# Patient Record
Sex: MALE | Race: WHITE | NOT HISPANIC OR LATINO
[De-identification: names, ages, dates, MRNs, and addresses within clinical notes are randomized per-mention and may not be internally consistent; named-entity substitution may affect disease eponyms.]

---

## 2017-05-04 ENCOUNTER — NON-APPOINTMENT (OUTPATIENT)
Age: 72
End: 2017-05-04

## 2017-05-04 ENCOUNTER — APPOINTMENT (OUTPATIENT)
Dept: ELECTROPHYSIOLOGY | Facility: CLINIC | Age: 72
End: 2017-05-04

## 2017-05-04 VITALS
WEIGHT: 172 LBS | HEART RATE: 70 BPM | DIASTOLIC BLOOD PRESSURE: 87 MMHG | BODY MASS INDEX: 26.07 KG/M2 | HEIGHT: 68 IN | SYSTOLIC BLOOD PRESSURE: 147 MMHG | OXYGEN SATURATION: 97 %

## 2017-05-04 DIAGNOSIS — Z86.79 PERSONAL HISTORY OF OTHER DISEASES OF THE CIRCULATORY SYSTEM: ICD-10-CM

## 2017-05-19 ENCOUNTER — MESSAGE (OUTPATIENT)
Age: 72
End: 2017-05-19

## 2017-05-24 ENCOUNTER — MEDICATION RENEWAL (OUTPATIENT)
Age: 72
End: 2017-05-24

## 2017-05-30 ENCOUNTER — APPOINTMENT (OUTPATIENT)
Dept: ELECTROPHYSIOLOGY | Facility: CLINIC | Age: 72
End: 2017-05-30

## 2017-06-16 ENCOUNTER — OUTPATIENT (OUTPATIENT)
Dept: OUTPATIENT SERVICES | Facility: HOSPITAL | Age: 72
LOS: 1 days | End: 2017-06-16
Payer: MEDICARE

## 2017-06-16 VITALS
OXYGEN SATURATION: 99 % | HEIGHT: 68 IN | RESPIRATION RATE: 18 BRPM | HEART RATE: 63 BPM | WEIGHT: 169.98 LBS | TEMPERATURE: 99 F | DIASTOLIC BLOOD PRESSURE: 76 MMHG | SYSTOLIC BLOOD PRESSURE: 136 MMHG

## 2017-06-16 DIAGNOSIS — Z01.818 ENCOUNTER FOR OTHER PREPROCEDURAL EXAMINATION: ICD-10-CM

## 2017-06-16 DIAGNOSIS — Z90.49 ACQUIRED ABSENCE OF OTHER SPECIFIED PARTS OF DIGESTIVE TRACT: Chronic | ICD-10-CM

## 2017-06-16 DIAGNOSIS — I48.4 ATYPICAL ATRIAL FLUTTER: ICD-10-CM

## 2017-06-16 LAB
ALBUMIN SERPL ELPH-MCNC: 3.7 G/DL — SIGNIFICANT CHANGE UP (ref 3.3–5)
ALP SERPL-CCNC: 46 U/L — SIGNIFICANT CHANGE UP (ref 40–120)
ALT FLD-CCNC: 20 U/L RC — SIGNIFICANT CHANGE UP (ref 10–45)
ANION GAP SERPL CALC-SCNC: 11 MMOL/L — SIGNIFICANT CHANGE UP (ref 5–17)
APTT BLD: 33 SEC — SIGNIFICANT CHANGE UP (ref 27.5–37.4)
AST SERPL-CCNC: 25 U/L — SIGNIFICANT CHANGE UP (ref 10–40)
BILIRUB SERPL-MCNC: 0.4 MG/DL — SIGNIFICANT CHANGE UP (ref 0.2–1.2)
BLD GP AB SCN SERPL QL: NEGATIVE — SIGNIFICANT CHANGE UP
BUN SERPL-MCNC: 16 MG/DL — SIGNIFICANT CHANGE UP (ref 7–23)
CALCIUM SERPL-MCNC: 9.9 MG/DL — SIGNIFICANT CHANGE UP (ref 8.4–10.5)
CHLORIDE SERPL-SCNC: 105 MMOL/L — SIGNIFICANT CHANGE UP (ref 96–108)
CO2 SERPL-SCNC: 27 MMOL/L — SIGNIFICANT CHANGE UP (ref 22–31)
CREAT SERPL-MCNC: 0.7 MG/DL — SIGNIFICANT CHANGE UP (ref 0.5–1.3)
GLUCOSE SERPL-MCNC: 94 MG/DL — SIGNIFICANT CHANGE UP (ref 70–99)
HCT VFR BLD CALC: 42.6 % — SIGNIFICANT CHANGE UP (ref 39–50)
HGB BLD-MCNC: 13.8 G/DL — SIGNIFICANT CHANGE UP (ref 13–17)
INR BLD: 0.98 RATIO — SIGNIFICANT CHANGE UP (ref 0.88–1.16)
MCHC RBC-ENTMCNC: 31.9 PG — SIGNIFICANT CHANGE UP (ref 27–34)
MCHC RBC-ENTMCNC: 32.4 GM/DL — SIGNIFICANT CHANGE UP (ref 32–36)
MCV RBC AUTO: 98.5 FL — SIGNIFICANT CHANGE UP (ref 80–100)
PLATELET # BLD AUTO: 224 K/UL — SIGNIFICANT CHANGE UP (ref 150–400)
POTASSIUM SERPL-MCNC: 4 MMOL/L — SIGNIFICANT CHANGE UP (ref 3.5–5.3)
POTASSIUM SERPL-SCNC: 4 MMOL/L — SIGNIFICANT CHANGE UP (ref 3.5–5.3)
PROT SERPL-MCNC: 7.1 G/DL — SIGNIFICANT CHANGE UP (ref 6–8.3)
PROTHROM AB SERPL-ACNC: 10.7 SEC — SIGNIFICANT CHANGE UP (ref 9.8–12.7)
RBC # BLD: 4.32 M/UL — SIGNIFICANT CHANGE UP (ref 4.2–5.8)
RBC # FLD: 11.8 % — SIGNIFICANT CHANGE UP (ref 10.3–14.5)
RH IG SCN BLD-IMP: POSITIVE — SIGNIFICANT CHANGE UP
SODIUM SERPL-SCNC: 143 MMOL/L — SIGNIFICANT CHANGE UP (ref 135–145)
WBC # BLD: 5.1 K/UL — SIGNIFICANT CHANGE UP (ref 3.8–10.5)
WBC # FLD AUTO: 5.1 K/UL — SIGNIFICANT CHANGE UP (ref 3.8–10.5)

## 2017-06-16 PROCEDURE — 93010 ELECTROCARDIOGRAM REPORT: CPT

## 2017-06-16 NOTE — H&P CARDIOLOGY - PMH
Asthma, Chronic    BPH (Benign Prostatic Hyperplasia)    CAD (Coronary Artery Disease)    Paroxysmal Atrial Fibrillation    Popliteal Aneurysm

## 2017-06-16 NOTE — H&P CARDIOLOGY - HISTORY OF PRESENT ILLNESS
72 yo male pt with PMHx of CAD s/p stent x5 in 2006, asthma, BPH, b/l popliteal aneurysms s/p right popliteal stent and left popliteal bypass(2006 in [place]), asthma, A-fib ablation 7 years ago presents for PST for A-fib ablation on Monday (6/19). Pt reports he gets palpitation, paroxysmal a-fib about a year ago, evaluated by Dr. Jose Roberto Meraz then referred to Dr. Simmons for further evaluation and treatment. Currently pt denies palpitation or SOB.

## 2017-06-19 ENCOUNTER — OUTPATIENT (OUTPATIENT)
Dept: INPATIENT UNIT | Facility: HOSPITAL | Age: 72
LOS: 1 days | End: 2017-06-19
Payer: MEDICARE

## 2017-06-19 VITALS
TEMPERATURE: 98 F | DIASTOLIC BLOOD PRESSURE: 64 MMHG | WEIGHT: 169.98 LBS | OXYGEN SATURATION: 94 % | HEART RATE: 70 BPM | RESPIRATION RATE: 18 BRPM | SYSTOLIC BLOOD PRESSURE: 134 MMHG | HEIGHT: 68 IN

## 2017-06-19 DIAGNOSIS — I25.10 ATHEROSCLEROTIC HEART DISEASE OF NATIVE CORONARY ARTERY WITHOUT ANGINA PECTORIS: ICD-10-CM

## 2017-06-19 DIAGNOSIS — I48.0 PAROXYSMAL ATRIAL FIBRILLATION: ICD-10-CM

## 2017-06-19 DIAGNOSIS — I48.4 ATYPICAL ATRIAL FLUTTER: ICD-10-CM

## 2017-06-19 DIAGNOSIS — Z90.49 ACQUIRED ABSENCE OF OTHER SPECIFIED PARTS OF DIGESTIVE TRACT: Chronic | ICD-10-CM

## 2017-06-19 DIAGNOSIS — J45.909 UNSPECIFIED ASTHMA, UNCOMPLICATED: ICD-10-CM

## 2017-06-19 LAB
ALBUMIN SERPL ELPH-MCNC: 3.6 G/DL — SIGNIFICANT CHANGE UP (ref 3.3–5)
ALP SERPL-CCNC: 51 U/L — SIGNIFICANT CHANGE UP (ref 40–120)
ALT FLD-CCNC: 59 U/L RC — HIGH (ref 10–45)
ANION GAP SERPL CALC-SCNC: 13 MMOL/L — SIGNIFICANT CHANGE UP (ref 5–17)
APTT BLD: > 200 SEC (ref 27.5–37.4)
AST SERPL-CCNC: 98 U/L — HIGH (ref 10–40)
BILIRUB SERPL-MCNC: 0.7 MG/DL — SIGNIFICANT CHANGE UP (ref 0.2–1.2)
BUN SERPL-MCNC: 15 MG/DL — SIGNIFICANT CHANGE UP (ref 7–23)
CALCIUM SERPL-MCNC: 8.8 MG/DL — SIGNIFICANT CHANGE UP (ref 8.4–10.5)
CHLORIDE SERPL-SCNC: 108 MMOL/L — SIGNIFICANT CHANGE UP (ref 96–108)
CO2 SERPL-SCNC: 22 MMOL/L — SIGNIFICANT CHANGE UP (ref 22–31)
CREAT SERPL-MCNC: 0.7 MG/DL — SIGNIFICANT CHANGE UP (ref 0.5–1.3)
GLUCOSE SERPL-MCNC: 132 MG/DL — HIGH (ref 70–99)
HCT VFR BLD CALC: 40.3 % — SIGNIFICANT CHANGE UP (ref 39–50)
HGB BLD-MCNC: 13.5 G/DL — SIGNIFICANT CHANGE UP (ref 13–17)
INR BLD: 1.33 RATIO — HIGH (ref 0.88–1.16)
MAGNESIUM SERPL-MCNC: 1.9 MG/DL — SIGNIFICANT CHANGE UP (ref 1.6–2.6)
MCHC RBC-ENTMCNC: 33.2 PG — SIGNIFICANT CHANGE UP (ref 27–34)
MCHC RBC-ENTMCNC: 33.6 GM/DL — SIGNIFICANT CHANGE UP (ref 32–36)
MCV RBC AUTO: 98.8 FL — SIGNIFICANT CHANGE UP (ref 80–100)
PHOSPHATE SERPL-MCNC: 3.2 MG/DL — SIGNIFICANT CHANGE UP (ref 2.5–4.5)
PLATELET # BLD AUTO: 187 K/UL — SIGNIFICANT CHANGE UP (ref 150–400)
POTASSIUM SERPL-MCNC: 3.7 MMOL/L — SIGNIFICANT CHANGE UP (ref 3.5–5.3)
POTASSIUM SERPL-SCNC: 3.7 MMOL/L — SIGNIFICANT CHANGE UP (ref 3.5–5.3)
PROT SERPL-MCNC: 6.6 G/DL — SIGNIFICANT CHANGE UP (ref 6–8.3)
PROTHROM AB SERPL-ACNC: 14.4 SEC — HIGH (ref 9.8–12.7)
RBC # BLD: 4.08 M/UL — LOW (ref 4.2–5.8)
RBC # FLD: 12.3 % — SIGNIFICANT CHANGE UP (ref 10.3–14.5)
SODIUM SERPL-SCNC: 143 MMOL/L — SIGNIFICANT CHANGE UP (ref 135–145)
WBC # BLD: 7 K/UL — SIGNIFICANT CHANGE UP (ref 3.8–10.5)
WBC # FLD AUTO: 7 K/UL — SIGNIFICANT CHANGE UP (ref 3.8–10.5)

## 2017-06-19 PROCEDURE — 93010 ELECTROCARDIOGRAM REPORT: CPT

## 2017-06-19 RX ORDER — SIMVASTATIN 20 MG/1
40 TABLET, FILM COATED ORAL AT BEDTIME
Qty: 0 | Refills: 0 | Status: DISCONTINUED | OUTPATIENT
Start: 2017-06-19 | End: 2017-06-20

## 2017-06-19 RX ORDER — PANTOPRAZOLE SODIUM 20 MG/1
40 TABLET, DELAYED RELEASE ORAL
Qty: 0 | Refills: 0 | Status: DISCONTINUED | OUTPATIENT
Start: 2017-06-19 | End: 2017-06-20

## 2017-06-19 RX ORDER — BUDESONIDE AND FORMOTEROL FUMARATE DIHYDRATE 160; 4.5 UG/1; UG/1
2 AEROSOL RESPIRATORY (INHALATION)
Qty: 0 | Refills: 0 | Status: DISCONTINUED | OUTPATIENT
Start: 2017-06-19 | End: 2017-06-19

## 2017-06-19 RX ORDER — ALBUTEROL 90 UG/1
2 AEROSOL, METERED ORAL EVERY 6 HOURS
Qty: 0 | Refills: 0 | Status: DISCONTINUED | OUTPATIENT
Start: 2017-06-19 | End: 2017-06-20

## 2017-06-19 RX ORDER — POTASSIUM CHLORIDE 20 MEQ
40 PACKET (EA) ORAL ONCE
Qty: 0 | Refills: 0 | Status: COMPLETED | OUTPATIENT
Start: 2017-06-19 | End: 2017-06-19

## 2017-06-19 RX ORDER — HEPARIN SODIUM 5000 [USP'U]/ML
INJECTION INTRAVENOUS; SUBCUTANEOUS
Qty: 25000 | Refills: 0 | Status: DISCONTINUED | OUTPATIENT
Start: 2017-06-19 | End: 2017-06-20

## 2017-06-19 RX ORDER — HEPARIN SODIUM 5000 [USP'U]/ML
4700 INJECTION INTRAVENOUS; SUBCUTANEOUS EVERY 6 HOURS
Qty: 0 | Refills: 0 | Status: DISCONTINUED | OUTPATIENT
Start: 2017-06-19 | End: 2017-06-20

## 2017-06-19 RX ORDER — SUCRALFATE 1 G
1 TABLET ORAL EVERY 6 HOURS
Qty: 0 | Refills: 0 | Status: DISCONTINUED | OUTPATIENT
Start: 2017-06-19 | End: 2017-06-20

## 2017-06-19 RX ORDER — ASPIRIN/CALCIUM CARB/MAGNESIUM 324 MG
81 TABLET ORAL DAILY
Qty: 0 | Refills: 0 | Status: DISCONTINUED | OUTPATIENT
Start: 2017-06-20 | End: 2017-06-20

## 2017-06-19 RX ORDER — MOMETASONE FUROATE AND FORMOTEROL FUMARATE DIHYDRATE 200; 5 UG/1; UG/1
2 AEROSOL RESPIRATORY (INHALATION)
Qty: 0 | Refills: 0 | Status: DISCONTINUED | OUTPATIENT
Start: 2017-06-19 | End: 2017-06-20

## 2017-06-19 RX ORDER — DRONEDARONE 400 MG/1
400 TABLET, FILM COATED ORAL
Qty: 0 | Refills: 0 | Status: DISCONTINUED | OUTPATIENT
Start: 2017-06-19 | End: 2017-06-20

## 2017-06-19 RX ORDER — MAGNESIUM SULFATE 500 MG/ML
1 VIAL (ML) INJECTION ONCE
Qty: 0 | Refills: 0 | Status: COMPLETED | OUTPATIENT
Start: 2017-06-19 | End: 2017-06-19

## 2017-06-19 RX ORDER — ASPIRIN/CALCIUM CARB/MAGNESIUM 324 MG
325 TABLET ORAL DAILY
Qty: 0 | Refills: 0 | Status: DISCONTINUED | OUTPATIENT
Start: 2017-06-19 | End: 2017-06-19

## 2017-06-19 RX ORDER — MONTELUKAST 4 MG/1
10 TABLET, CHEWABLE ORAL DAILY
Qty: 0 | Refills: 0 | Status: DISCONTINUED | OUTPATIENT
Start: 2017-06-19 | End: 2017-06-20

## 2017-06-19 RX ADMIN — Medication 1 GRAM(S): at 23:43

## 2017-06-19 RX ADMIN — BUDESONIDE AND FORMOTEROL FUMARATE DIHYDRATE 2 PUFF(S): 160; 4.5 AEROSOL RESPIRATORY (INHALATION) at 21:06

## 2017-06-19 RX ADMIN — DRONEDARONE 400 MILLIGRAM(S): 400 TABLET, FILM COATED ORAL at 23:22

## 2017-06-19 RX ADMIN — Medication 1 DROP(S): at 23:22

## 2017-06-19 RX ADMIN — HEPARIN SODIUM 950 UNIT(S)/HR: 5000 INJECTION INTRAVENOUS; SUBCUTANEOUS at 23:30

## 2017-06-19 RX ADMIN — Medication 40 MILLIEQUIVALENT(S): at 21:05

## 2017-06-19 RX ADMIN — SIMVASTATIN 40 MILLIGRAM(S): 20 TABLET, FILM COATED ORAL at 21:07

## 2017-06-19 RX ADMIN — Medication 100 GRAM(S): at 21:04

## 2017-06-19 NOTE — PATIENT PROFILE ADULT. - NS SC CAGE ALCOHOL EYE OPENER
Problem: Chemotherapy Treatment  Goal: *Chemotherapy regimen followed  Outcome: Progressing Towards Goal  Pt here for C5. no

## 2017-06-19 NOTE — PROGRESS NOTE ADULT - SUBJECTIVE AND OBJECTIVE BOX
Patient is a 71y old  Male who presents with a chief complaint of  A fib  HPI:  72 yo male pt with PMHx of CAD s/p stent x5 in 2006, asthma, BPH, b/l popliteal aneurysms s/p right popliteal stent and left popliteal bypass(2006 in [place]), asthma, A-fib ablation 7 years ago presents for PST for A-fib ablation on Monday (6/19). Pt reports he gets palpitation, paroxysmal a-fib about a year ago, evaluated by Dr. Jose Roberto Meraz then referred to Dr. Simmons for further evaluation and treatment. Currently pt denies palpitation or SOB. (16 Jun 2017 08:53)      Interval Events: Now s/p A fib ablation    Medications  simvastatin 40milliGRAM(s) Oral at bedtime  buDESOnide 160 MICROgram(s)/formoterol 4.5 MICROgram(s) Inhaler 2Puff(s) Inhalation two times a day  dronedarone 400milliGRAM(s) Oral two times a day  potassium chloride    Tablet ER 40milliEquivalent(s) Oral once  magnesium sulfate  IVPB 1Gram(s) IV Intermittent once    ALBUTerol    90 MICROgram(s) HFA Inhaler 2Puff(s) Inhalation every 6 hours PRN  diltiazem    Tablet 30milliGRAM(s) Oral four times a day PRN  montelukast 10milliGRAM(s) Oral daily PRN  artificial  tears Solution 1Drop(s) Right EYE every 4 hours PRN      VS  T(C): 36.7, Max: 36.8 (06-19 @ 10:13)  HR: 98 (70 - 100)  BP: 123/75 (123/75 - 134/64)  RR: 15 (15 - 18)  SpO2: 96% (94% - 97%)  Wt(kg): --          REVIEW OF SYSTEMS  denies chest pain, palpitations, SOB, N, V; c/o eye dryness and right eye irritated;  all other negative        PHYSICAL EXAM:      Constitutional: well developed well nourished; NAD    HEENT: NC/AT; normal oral mucosa    Respiratory: regular unlabored    Cardiovascular: RRR; S1 S2 mild LE edema  Bilateral groin sites - dressings D & I; soft, sutures in place; no hematoma or ecchymosis    Gastrointestinal: soft ND/NT; + bowel sounds    Genitourinary: Caal cath draining clear yellow fluid    Extremities: ALCALA    Vascular: warm peripherally    Neurological: A & O x3    Skin: warm, dry, no rash or cyanosis          HEALTH ISSUES - PROBLEM Dx:

## 2017-06-19 NOTE — PROGRESS NOTE ADULT - ASSESSMENT
70 yo male pt with PMH of CAD s/p stent x5 in 2006, asthma, BPH, b/l popliteal aneurysms s/p right popliteal stent and left popliteal bypass(2006 in [place]), asthma, A-fib ablation 7 years ago.  Now s/p A-fib ablation

## 2017-06-19 NOTE — CHART NOTE - NSCHARTNOTEFT_GEN_A_CORE
====================  NEW EVENTS:  ====================  s/p Afib Ablation  New onset R eye pain    ====================  SUMMARY:  ====================  72 yo male pt with PMHx of CAD s/p stent x5 in 2006, asthma, BPH, b/l popliteal aneurysms s/p right popliteal stent and left popliteal bypass(2006 in [place]), asthma, A-fib ablation 7 years ago presents for PST for A-fib ablation on Monday (6/19). Pt reports he gets palpitation, paroxysmal a-fib about a year ago, evaluated by Dr. Jose Roberto Meraz then referred to Dr. Simmons for further evaluation and treatment. Currently pt denies palpitation or SOB.    ====================  VITALS:  ====================    ICU Vital Signs Last 24 Hrs  T(C): 36.7, Max: 36.8 (06-19 @ 10:13)  T(F): 98.1, Max: 98.2 (06-19 @ 10:13)  HR: 88 (70 - 100)  BP: 123/70 (115/66 - 134/64)  BP(mean): 86 (81 - 95)  ABP: --  ABP(mean): --  RR: 16 (15 - 21)  SpO2: 95% (94% - 98%)      I&O's Summary    I & Os for current day (as of 19 Jun 2017 23:16)  =============================================  IN: 250 ml / OUT: 750 ml / NET: -500 ml    ====================  LABS:  ====================                          13.5   7.0   )-----------( 187      ( 19 Jun 2017 18:55 )             40.3     06-19    143  |  108  |  15  ----------------------------<  132<H>  3.7   |  22  |  0.70    Ca    8.8      19 Jun 2017 18:55  Phos  3.2     06-19  Mg     1.9     06-19    TPro  6.6  /  Alb  3.6  /  TBili  0.7  /  DBili  x   /  AST  98<H>  /  ALT  59<H>  /  AlkPhos  51  06-19    PT/INR - ( 19 Jun 2017 18:55 )   PT: 14.4 sec;   INR: 1.33 ratio         PTT - ( 19 Jun 2017 18:55 )  PTT:> 200 sec        ====================  PLAN:  ====================  1) S/P Afib ablation  -Hep gtt  - Start PPI and Carafate  - Continue multaq, Diltiazem  -TTE in AM

## 2017-06-20 ENCOUNTER — TRANSCRIPTION ENCOUNTER (OUTPATIENT)
Age: 72
End: 2017-06-20

## 2017-06-20 VITALS — SYSTOLIC BLOOD PRESSURE: 119 MMHG | HEART RATE: 74 BPM | DIASTOLIC BLOOD PRESSURE: 71 MMHG

## 2017-06-20 LAB
ANION GAP SERPL CALC-SCNC: 11 MMOL/L — SIGNIFICANT CHANGE UP (ref 5–17)
APTT BLD: 72.5 SEC — HIGH (ref 27.5–37.4)
BUN SERPL-MCNC: 12 MG/DL — SIGNIFICANT CHANGE UP (ref 7–23)
CALCIUM SERPL-MCNC: 9.3 MG/DL — SIGNIFICANT CHANGE UP (ref 8.4–10.5)
CHLORIDE SERPL-SCNC: 105 MMOL/L — SIGNIFICANT CHANGE UP (ref 96–108)
CO2 SERPL-SCNC: 24 MMOL/L — SIGNIFICANT CHANGE UP (ref 22–31)
CREAT SERPL-MCNC: 0.79 MG/DL — SIGNIFICANT CHANGE UP (ref 0.5–1.3)
GLUCOSE SERPL-MCNC: 136 MG/DL — HIGH (ref 70–99)
HCT VFR BLD CALC: 38.5 % — LOW (ref 39–50)
HGB BLD-MCNC: 13.3 G/DL — SIGNIFICANT CHANGE UP (ref 13–17)
MAGNESIUM SERPL-MCNC: 2.1 MG/DL — SIGNIFICANT CHANGE UP (ref 1.6–2.6)
MCHC RBC-ENTMCNC: 34.3 PG — HIGH (ref 27–34)
MCHC RBC-ENTMCNC: 34.6 GM/DL — SIGNIFICANT CHANGE UP (ref 32–36)
MCV RBC AUTO: 99.1 FL — SIGNIFICANT CHANGE UP (ref 80–100)
PHOSPHATE SERPL-MCNC: 3.3 MG/DL — SIGNIFICANT CHANGE UP (ref 2.5–4.5)
PLATELET # BLD AUTO: 181 K/UL — SIGNIFICANT CHANGE UP (ref 150–400)
POTASSIUM SERPL-MCNC: 5.3 MMOL/L — SIGNIFICANT CHANGE UP (ref 3.5–5.3)
POTASSIUM SERPL-SCNC: 5.3 MMOL/L — SIGNIFICANT CHANGE UP (ref 3.5–5.3)
RBC # BLD: 3.88 M/UL — LOW (ref 4.2–5.8)
RBC # FLD: 12.1 % — SIGNIFICANT CHANGE UP (ref 10.3–14.5)
SODIUM SERPL-SCNC: 140 MMOL/L — SIGNIFICANT CHANGE UP (ref 135–145)
WBC # BLD: 9.5 K/UL — SIGNIFICANT CHANGE UP (ref 3.8–10.5)
WBC # FLD AUTO: 9.5 K/UL — SIGNIFICANT CHANGE UP (ref 3.8–10.5)

## 2017-06-20 PROCEDURE — 93657 TX L/R ATRIAL FIB ADDL: CPT

## 2017-06-20 PROCEDURE — C1759: CPT

## 2017-06-20 PROCEDURE — C1732: CPT

## 2017-06-20 PROCEDURE — 93005 ELECTROCARDIOGRAM TRACING: CPT

## 2017-06-20 PROCEDURE — 93321 DOPPLER ECHO F-UP/LMTD STD: CPT

## 2017-06-20 PROCEDURE — C1894: CPT

## 2017-06-20 PROCEDURE — 86901 BLOOD TYPING SEROLOGIC RH(D): CPT

## 2017-06-20 PROCEDURE — C1893: CPT

## 2017-06-20 PROCEDURE — 86850 RBC ANTIBODY SCREEN: CPT

## 2017-06-20 PROCEDURE — 83735 ASSAY OF MAGNESIUM: CPT

## 2017-06-20 PROCEDURE — 86900 BLOOD TYPING SEROLOGIC ABO: CPT

## 2017-06-20 PROCEDURE — 93623 PRGRMD STIMJ&PACG IV RX NFS: CPT

## 2017-06-20 PROCEDURE — C1766: CPT

## 2017-06-20 PROCEDURE — 93613 INTRACARDIAC EPHYS 3D MAPG: CPT

## 2017-06-20 PROCEDURE — 85610 PROTHROMBIN TIME: CPT

## 2017-06-20 PROCEDURE — 84100 ASSAY OF PHOSPHORUS: CPT

## 2017-06-20 PROCEDURE — 93662 INTRACARDIAC ECG (ICE): CPT

## 2017-06-20 PROCEDURE — 93010 ELECTROCARDIOGRAM REPORT: CPT

## 2017-06-20 PROCEDURE — 85027 COMPLETE CBC AUTOMATED: CPT

## 2017-06-20 PROCEDURE — 80053 COMPREHEN METABOLIC PANEL: CPT

## 2017-06-20 PROCEDURE — 93308 TTE F-UP OR LMTD: CPT

## 2017-06-20 PROCEDURE — 93656 COMPRE EP EVAL ABLTJ ATR FIB: CPT

## 2017-06-20 PROCEDURE — 80048 BASIC METABOLIC PNL TOTAL CA: CPT

## 2017-06-20 PROCEDURE — 94640 AIRWAY INHALATION TREATMENT: CPT

## 2017-06-20 PROCEDURE — 85730 THROMBOPLASTIN TIME PARTIAL: CPT

## 2017-06-20 PROCEDURE — C1731: CPT

## 2017-06-20 RX ORDER — PANTOPRAZOLE SODIUM 20 MG/1
1 TABLET, DELAYED RELEASE ORAL
Qty: 30 | Refills: 0 | OUTPATIENT
Start: 2017-06-20 | End: 2017-07-20

## 2017-06-20 RX ORDER — DILTIAZEM HCL 120 MG
1 CAPSULE, EXT RELEASE 24 HR ORAL
Qty: 120 | Refills: 0 | OUTPATIENT
Start: 2017-06-20 | End: 2017-07-20

## 2017-06-20 RX ORDER — DRONEDARONE 400 MG/1
1 TABLET, FILM COATED ORAL
Qty: 60 | Refills: 0 | OUTPATIENT
Start: 2017-06-20 | End: 2017-07-20

## 2017-06-20 RX ORDER — DRONEDARONE 400 MG/1
1 TABLET, FILM COATED ORAL
Qty: 0 | Refills: 0 | COMMUNITY

## 2017-06-20 RX ORDER — ASPIRIN/CALCIUM CARB/MAGNESIUM 324 MG
1 TABLET ORAL
Qty: 30 | Refills: 0 | OUTPATIENT
Start: 2017-06-20 | End: 2017-07-20

## 2017-06-20 RX ORDER — APIXABAN 2.5 MG/1
1 TABLET, FILM COATED ORAL
Qty: 60 | Refills: 0 | OUTPATIENT
Start: 2017-06-20 | End: 2017-07-20

## 2017-06-20 RX ORDER — SUCRALFATE 1 G
1 TABLET ORAL
Qty: 120 | Refills: 0 | OUTPATIENT
Start: 2017-06-20 | End: 2017-07-20

## 2017-06-20 RX ORDER — APIXABAN 2.5 MG/1
5 TABLET, FILM COATED ORAL
Qty: 0 | Refills: 0 | Status: DISCONTINUED | OUTPATIENT
Start: 2017-06-20 | End: 2017-06-20

## 2017-06-20 RX ORDER — ASPIRIN/CALCIUM CARB/MAGNESIUM 324 MG
1 TABLET ORAL
Qty: 0 | Refills: 0 | COMMUNITY

## 2017-06-20 RX ORDER — DILTIAZEM HCL 120 MG
1 CAPSULE, EXT RELEASE 24 HR ORAL
Qty: 0 | Refills: 0 | COMMUNITY

## 2017-06-20 RX ADMIN — DRONEDARONE 400 MILLIGRAM(S): 400 TABLET, FILM COATED ORAL at 07:07

## 2017-06-20 RX ADMIN — Medication 81 MILLIGRAM(S): at 09:37

## 2017-06-20 RX ADMIN — MOMETASONE FUROATE AND FORMOTEROL FUMARATE DIHYDRATE 2 PUFF(S): 200; 5 AEROSOL RESPIRATORY (INHALATION) at 06:13

## 2017-06-20 RX ADMIN — APIXABAN 5 MILLIGRAM(S): 2.5 TABLET, FILM COATED ORAL at 09:37

## 2017-06-20 RX ADMIN — Medication 1 GRAM(S): at 09:37

## 2017-06-20 RX ADMIN — Medication 1 GRAM(S): at 05:41

## 2017-06-20 RX ADMIN — PANTOPRAZOLE SODIUM 40 MILLIGRAM(S): 20 TABLET, DELAYED RELEASE ORAL at 07:18

## 2017-06-20 RX ADMIN — Medication 1 GRAM(S): at 05:39

## 2017-06-20 RX ADMIN — HEPARIN SODIUM 950 UNIT(S)/HR: 5000 INJECTION INTRAVENOUS; SUBCUTANEOUS at 06:39

## 2017-06-20 RX ADMIN — Medication 1 DROP(S): at 05:42

## 2017-06-20 NOTE — PROGRESS NOTE ADULT - ASSESSMENT
70 yo male pt with PMH of CAD s/p stent x5 in 2006, asthma, BPH, b/l popliteal aneurysms s/p right popliteal stent and left popliteal bypass(2006 in [place]), asthma, A-fib ablation 7 years ago.  Now s/p A-fib ablation on 6/19.

## 2017-06-20 NOTE — DISCHARGE NOTE ADULT - HOSPITAL COURSE
70 yo male pt with PMHx of CAD s/p stent x5 in 2006, asthma, BPH, b/l popliteal aneurysms s/p right popliteal stent and left popliteal bypass(2006 in [place]), asthma, A-fib ablation 7 years ago presents for A-fib ablation. Pt reports he gets palpitation, paroxysmal a-fib about a year ago.

## 2017-06-20 NOTE — DISCHARGE NOTE ADULT - MEDICATION SUMMARY - MEDICATIONS TO TAKE
I will START or STAY ON the medications listed below when I get home from the hospital:    Aspirin Enteric Coated 325 mg oral delayed release tablet  -- 1 tab(s) by mouth once a day  -- Indication: For CAD (Coronary Artery Disease)    Multaq 400 mg oral tablet  -- 1 tab(s) by mouth 2 times a day  -- Indication: For Paroxysmal Atrial Fibrillation    dilTIAZem 30 mg oral tablet  -- 1 tab(s) by mouth 4 times a day, As Needed  -- Indication: For Paroxysmal Atrial Fibrillation    apixaban 5 mg oral tablet  -- 1 tab(s) by mouth 2 times a day  -- Indication: For Paroxysmal Atrial Fibrillation    simvastatin 40 mg oral tablet  -- 1 tab(s) by mouth once a day (at bedtime)  -- Indication: For CAD (Coronary Artery Disease)    Dulera 100 mcg-5 mcg/inh inhalation aerosol  -- 2 puff(s) inhaled 2 times a day  -- Indication: For Asthma, Chronic    Ventolin HFA 90 mcg/inh inhalation aerosol  -- 2 puff(s) inhaled 4 times a day, As Needed  -- Indication: For Asthma, Chronic    Singulair 10 mg oral tablet  -- 1 tab(s) by mouth once a day, As Needed  -- Indication: For Asthma, Chronic    Carafate 1 g oral tablet  -- 1 tab(s) by mouth every 6 hours  -- Indication: For GI Prophylaxis    pantoprazole 40 mg oral delayed release tablet  -- 1 tab(s) by mouth once a day (before a meal)  -- Indication: For GI Prophylaxis I will START or STAY ON the medications listed below when I get home from the hospital:    aspirin 81 mg oral delayed release tablet  -- 1 tab(s) by mouth once a day  -- Indication: For CAD (Coronary Artery Disease)    Multaq 400 mg oral tablet  -- 1 tab(s) by mouth 2 times a day  -- Indication: For Paroxysmal Atrial Fibrillation    dilTIAZem 30 mg oral tablet  -- 1 tab(s) by mouth 4 times a day, As Needed  -- Indication: For Paroxysmal Atrial Fibrillation    apixaban 5 mg oral tablet  -- 1 tab(s) by mouth 2 times a day  -- Indication: For Paroxysmal Atrial Fibrillation    simvastatin 40 mg oral tablet  -- 1 tab(s) by mouth once a day (at bedtime)  -- Indication: For CAD (Coronary Artery Disease)    Dulera 100 mcg-5 mcg/inh inhalation aerosol  -- 2 puff(s) inhaled 2 times a day  -- Indication: For Asthma, Chronic    Ventolin HFA 90 mcg/inh inhalation aerosol  -- 2 puff(s) inhaled 4 times a day, As Needed  -- Indication: For Asthma, Chronic    Singulair 10 mg oral tablet  -- 1 tab(s) by mouth once a day, As Needed  -- Indication: For Asthma, Chronic    Carafate 1 g oral tablet  -- 1 tab(s) by mouth every 6 hours  -- Indication: For GI Prophylaxis    pantoprazole 40 mg oral delayed release tablet  -- 1 tab(s) by mouth once a day (before a meal)  -- Indication: For GI Prophylaxis

## 2017-06-20 NOTE — DISCHARGE NOTE ADULT - CARE PLAN
Principal Discharge DX:	Paroxysmal Atrial Fibrillation  Goal:	Your heart rate and rhythm will be controlled.  Instructions for follow-up, activity and diet:	You have undergone an atrial fibulation ablation. Please continue your home medications. Please follow up with your cardiologist within 2 weeks. Do not lift heavy objects for 1 week. Do not exercise for 2 weeks. Occasional skipped beats or palpations which last a few hours a common after the procedure, but generally resolve after 3 months. Please call the office or go to the ER if you have chest pain, discharge, redness, swelling at the insertion site, dizziness or fainting.

## 2017-06-20 NOTE — PROGRESS NOTE ADULT - SUBJECTIVE AND OBJECTIVE BOX
Patient is a 71y old  Male who presents with a chief complaint of Afib ablation (20 Jun 2017 04:18)      Overnight Event:    REVIEW OF SYSTEMS:  	    MEDICATIONS  (STANDING):  simvastatin 40milliGRAM(s) Oral at bedtime  aspirin enteric coated 81milliGRAM(s) Oral daily  dronedarone 400milliGRAM(s) Oral two times a day  pantoprazole    Tablet 40milliGRAM(s) Oral before breakfast  sucralfate 1Gram(s) Oral every 6 hours  heparin  Infusion. Unit(s)/Hr IV Continuous <Continuous>  mometasone 100 MICROgram(s)/formoterol 5 MICROgram(s) Inhaler 2Puff(s) Inhalation two times a day    MEDICATIONS  (PRN):  ALBUTerol    90 MICROgram(s) HFA Inhaler 2Puff(s) Inhalation every 6 hours PRN Wheezing  diltiazem    Tablet 30milliGRAM(s) Oral four times a day PRN a fib  montelukast 10milliGRAM(s) Oral daily PRN asthma  artificial  tears Solution 1Drop(s) Right EYE every 4 hours PRN Dry Eyes  heparin  Injectable 4700Unit(s) IV Push every 6 hours PRN For aPTT less than 40        PHYSICAL EXAM:  Vital Signs Last 24 Hrs  T(C): 36.6, Max: 36.8 (06-19 @ 10:13)  T(F): 97.8, Max: 98.2 (06-19 @ 10:13)  HR: 75 (70 - 100)  BP: 122/70 (106/65 - 134/64)  BP(mean): 86 (76 - 95)  RR: 11 (11 - 24)  SpO2: 96% (94% - 99%)  I&O's Summary  I & Os for 24h ending 20 Jun 2017 07:00  =============================================  IN: 335.5 ml / OUT: 1730 ml / NET: -1394.5 ml    I & Os for current day (as of 20 Jun 2017 07:19)  =============================================  IN: 9.5 ml / OUT: 0 ml / NET: 9.5 ml      Appearance: Normal	  HEENT:   Normal oral mucosa, PERRL, EOMI	  Lymphatic: No lymphadenopathy  Cardiovascular: Normal S1 S2, No JVD, No murmurs, No edema  Respiratory: Lungs clear to auscultation	  Psychiatry: A & O x 3, Mood & affect appropriate  Gastrointestinal:  Soft, Non-tender, + BS	  Skin: No rashes, No ecchymoses, No cyanosis	  Neurologic: Non-focal  Extremities: Normal range of motion, No clubbing, cyanosis or edema  Vascular: Peripheral pulses palpable 2+ bilaterally    LABS:	 	                        13.3   9.5   )-----------( 181      ( 20 Jun 2017 05:48 )             38.5     Auto Eosinophil # x     / Auto Eosinophil % x     / Auto Neutrophil # x     / Auto Neutrophil % x     / BANDS % x                            13.5   7.0   )-----------( 187      ( 19 Jun 2017 18:55 )             40.3     Auto Eosinophil # x     / Auto Eosinophil % x     / Auto Neutrophil # x     / Auto Neutrophil % x     / BANDS % x        INR: 1.33 ratio (06-19 @ 18:55)  INR: 0.98 ratio (06-16 @ 09:06)    06-20    140  |  105  |  12  ----------------------------<  136<H>  5.3   |  24  |  0.79  06-19    143  |  108  |  15  ----------------------------<  132<H>  3.7   |  22  |  0.70    Ca    9.3      20 Jun 2017 05:48  Mg     2.1     06-20  Phos  3.3     06-20  TPro  6.6  /  Alb  3.6  /  TBili  0.7  /  DBili  x   /  AST  98<H>  /  ALT  59<H>  /  AlkPhos  51  06-19        proBNP:   Lipid Profile:   HgA1c:   TSH:     CARDIAC MARKERS:        TELEMETRY: 	    ECG:  	  RADIOLOGY:  OTHER: 	    PREVIOUS DIAGNOSTIC TESTING:    [ ] Echocardiogram:  [ ]  Catheterization:  [ ] Stress Test:  	  	  MELISA ParnellW. D. [facility]  Contact # Patient is a 71y old  Male who presents with a chief complaint of Afib ablation (20 Jun 2017 04:18)    HPI:  70 yo male pt with PMHx of CAD s/p stent x5 in 2006, asthma, BPH, b/l popliteal aneurysms s/p right popliteal stent and left popliteal bypass(2006 in [place]), asthma, A-fib ablation 7 years ago presents for PST for A-fib ablation on Monday (6/19). Pt reports he gets palpitation, paroxysmal a-fib about a year ago, evaluated by Dr. Jose Roberto Meraz then referred to Dr. Simmons for further evaluation and treatment. Currently pt denies palpitation or SOB. (16 Jun 2017 08:53)      Overnight Event: No issues    REVIEW OF SYSTEMS: Groins feel sore  	    MEDICATIONS  (STANDING):  simvastatin 40milliGRAM(s) Oral at bedtime  aspirin enteric coated 81milliGRAM(s) Oral daily  dronedarone 400milliGRAM(s) Oral two times a day  pantoprazole    Tablet 40milliGRAM(s) Oral before breakfast  sucralfate 1Gram(s) Oral every 6 hours  heparin  Infusion. Unit(s)/Hr IV Continuous <Continuous>  mometasone 100 MICROgram(s)/formoterol 5 MICROgram(s) Inhaler 2Puff(s) Inhalation two times a day    MEDICATIONS  (PRN):  ALBUTerol    90 MICROgram(s) HFA Inhaler 2Puff(s) Inhalation every 6 hours PRN Wheezing  diltiazem    Tablet 30milliGRAM(s) Oral four times a day PRN a fib  montelukast 10milliGRAM(s) Oral daily PRN asthma  artificial  tears Solution 1Drop(s) Right EYE every 4 hours PRN Dry Eyes  heparin  Injectable 4700Unit(s) IV Push every 6 hours PRN For aPTT less than 40        PHYSICAL EXAM:  Vital Signs Last 24 Hrs  T(C): 36.6, Max: 36.8 (06-19 @ 10:13)  T(F): 97.8, Max: 98.2 (06-19 @ 10:13)  HR: 75 (70 - 100)  BP: 122/70 (106/65 - 134/64)  BP(mean): 86 (76 - 95)  RR: 11 (11 - 24)  SpO2: 96% (94% - 99%)  I&O's Summary  I & Os for 24h ending 20 Jun 2017 07:00  =============================================  IN: 335.5 ml / OUT: 1730 ml / NET: -1394.5 ml    I & Os for current day (as of 20 Jun 2017 07:19)  =============================================  IN: 9.5 ml / OUT: 0 ml / NET: 9.5 ml      Appearance: NAD  HEENT:   Normal oral mucosa, PERRL, EOMI	  Lymphatic: No lymphadenopathy  Cardiovascular: Normal S1 S2, No JVD, No murmurs, No edema  Respiratory: Lungs clear to auscultation	  Psychiatry: A & O x 3, Mood & affect appropriate  Gastrointestinal:  Soft, Non-tender, + BS	  Skin: No rashes, No ecchymoses, No cyanosis	  Neurologic: Non-focal  Extremities: Normal range of motion, No clubbing, cyanosis or edema  Vascular: Peripheral pulses palpable 2+ bilaterally  Right groin site; no bleeding, or ecchymosis  Left Groin site: no bleeding, or ecchymosis    LABS:	 	                        13.3   9.5   )-----------( 181      ( 20 Jun 2017 05:48 )             38.5     Auto Eosinophil # x     / Auto Eosinophil % x     / Auto Neutrophil # x     / Auto Neutrophil % x     / BANDS % x                            13.5   7.0   )-----------( 187      ( 19 Jun 2017 18:55 )             40.3     Auto Eosinophil # x     / Auto Eosinophil % x     / Auto Neutrophil # x     / Auto Neutrophil % x     / BANDS % x        INR: 1.33 ratio (06-19 @ 18:55)  INR: 0.98 ratio (06-16 @ 09:06)    06-20    140  |  105  |  12  ----------------------------<  136<H>  5.3   |  24  |  0.79  06-19    143  |  108  |  15  ----------------------------<  132<H>  3.7   |  22  |  0.70    Ca    9.3      20 Jun 2017 05:48  Mg     2.1     06-20  Phos  3.3     06-20  TPro  6.6  /  Alb  3.6  /  TBili  0.7  /  DBili  x   /  AST  98<H>  /  ALT  59<H>  /  AlkPhos  51  06-19          TELEMETRY: 	  NSR  ECG:  	    	  MELISA P[facility]ellCle[facility]  Contact #	[phone]

## 2017-06-20 NOTE — DISCHARGE NOTE ADULT - INSTRUCTIONS
Please maintain a soft diet for 2 weeks. No heavy lifting or straining. Please maintain a soft diet for 2 weeks. No heavy lifting or straining.  Continue PPI/ carafate/ mechanical soft diet for one month   c/w multaq/ diltiazem  Follow up with Dr Simmons on 7/18 at 2pm.

## 2017-06-20 NOTE — PROGRESS NOTE ADULT - ASSESSMENT
72 yo male pt with PMHx of CAD s/p stent x5 in 2006, asthma, BPH, b/l popliteal aneurysms s/p right popliteal stent and left popliteal bypass(2006 in [place]), asthma, A-fib ablation 7 years.  Admitted for A. fib Ablation.  Now s/p procedure.

## 2017-06-20 NOTE — PROGRESS NOTE ADULT - SUBJECTIVE AND OBJECTIVE BOX
INTERVAL HPI/OVERNIGHT EVENTS: Patient resting comfortably in chair, denies c/o CP, palpitations or SOB.     MEDICATIONS  (STANDING):  simvastatin 40milliGRAM(s) Oral at bedtime  aspirin enteric coated 81milliGRAM(s) Oral daily  dronedarone 400milliGRAM(s) Oral two times a day  pantoprazole    Tablet 40milliGRAM(s) Oral before breakfast  sucralfate 1Gram(s) Oral every 6 hours  heparin  Infusion. Unit(s)/Hr IV Continuous <Continuous>  mometasone 100 MICROgram(s)/formoterol 5 MICROgram(s) Inhaler 2Puff(s) Inhalation two times a day    MEDICATIONS  (PRN):  ALBUTerol    90 MICROgram(s) HFA Inhaler 2Puff(s) Inhalation every 6 hours PRN Wheezing  diltiazem    Tablet 30milliGRAM(s) Oral four times a day PRN a fib  montelukast 10milliGRAM(s) Oral daily PRN asthma  artificial  tears Solution 1Drop(s) Right EYE every 4 hours PRN Dry Eyes  heparin  Injectable 4700Unit(s) IV Push every 6 hours PRN For aPTT less than 40      Allergies    No Known Allergies    Intolerances      ROS:  General: Pt denies fever/chills    Cardiovascular: denies chest pain/palpitations/leg edema    Respiratory and Thorax: denies SOB/cough/wheezing    Gastrointestinal: denies abdominal pain/diarrhea/constipation/bloody stool    Musculoskeletal: denies joint pain or swelling, denies restricted motion    Skin: denies rashes/sores    Hematologic: denies abnormal bleeding    Vital Signs Last 24 Hrs  T(C): 36.4, Max: 36.8 (06-19 @ 10:13)  T(F): 97.6, Max: 98.2 (06-19 @ 10:13)  HR: 75 (70 - 100)  BP: 123/74 (97/63 - 134/64)  BP(mean): 90 (73 - 95)  RR: 13 (11 - 24)  SpO2: 99% (94% - 99%)    Physical Exam:  Constitutional: well developed, well nourished,  and no acute distress    Neurological: Alert & Oriented x 3,  no focal deficits    HEENT:   Neck supple.    Respiratory: CTA B/L, No wheezing/crackles/rhonchi    Cardiovascular: (+) S1 & S2, RRR    Gastrointestinal: soft, NT, nondistended, (+) BS    Genitourinary: non distended bladder, voiding freely    Extremities: No pedal edema, No clubbing, No cyanosis    Skin:  Bilateral groins with dry, sterile, dressing, clean, dry, intact. No hematoma, soft.     LABS:                        13.3   9.5   )-----------( 181      ( 20 Jun 2017 05:48 )             38.5     06-20    140  |  105  |  12  ----------------------------<  136<H>  5.3   |  24  |  0.79    Ca    9.3      20 Jun 2017 05:48  Phos  3.3     06-20  Mg     2.1     06-20    TPro  6.6  /  Alb  3.6  /  TBili  0.7  /  DBili  x   /  AST  98<H>  /  ALT  59<H>  /  AlkPhos  51  06-19    PT/INR - ( 19 Jun 2017 18:55 )   PT: 14.4 sec;   INR: 1.33 ratio         PTT - ( 20 Jun 2017 05:48 )  PTT:72.5 sec      TELE: NSR 70's

## 2017-06-20 NOTE — DISCHARGE NOTE ADULT - CARE PROVIDER_API CALL
Syed Simmons), Cardiac Electrophysiology; Cardiology  [address] [place] [address]  Phone: [phone]  Fax: [phone]

## 2017-06-20 NOTE — DISCHARGE NOTE ADULT - PLAN OF CARE
Your heart rate and rhythm will be controlled. You have undergone an atrial fibulation ablation. Please continue your home medications. Please follow up with your cardiologist within 2 weeks. Do not lift heavy objects for 1 week. Do not exercise for 2 weeks. Occasional skipped beats or palpations which last a few hours a common after the procedure, but generally resolve after 3 months. Please call the office or go to the ER if you have chest pain, discharge, redness, swelling at the insertion site, dizziness or fainting.

## 2017-06-20 NOTE — DISCHARGE NOTE ADULT - ADDITIONAL INSTRUCTIONS
No heavy lifting, strenuous activity, bending, straining, or unnecessary stair climbing for 2 weeks. No driving for 2 days. You may shower 24 hours following the procedure but avoid baths/swimming for 1 week. Check your groin site for bleeding and/or swelling daily following procedure and call your doctor immediately if it occurs or if you experience increased pain at the site. Follow up with your cardiologist in 1-2 weeks. You may call [facility] if you have any questions/concerns regarding your procedure [phone].

## 2017-06-20 NOTE — DISCHARGE NOTE ADULT - PATIENT PORTAL LINK FT
“You can access the [facility] Patient Portal, offered by [facility], by registering with the following website: [email or web address]”

## 2017-06-20 NOTE — PROGRESS NOTE ADULT - PROBLEM SELECTOR PLAN 1
Tolerated procedure well  Post ablation instructions reviewed with patient   Follow up Echocardiogram, if no effusion start Eliquis   Continue PPI/ carafate/ mechanical soft diet for one month   c/w multaq/ diltiazem  Follow up with Dr Simmons on 7/18 at 2pm
s/p ablation  monitor groin sites  remove sutures in am  start Heparin infusion at 11pm tonight   TTE in am   start PPI & carafate  Diet mechanical soft DASH  c/w multaq   c/w home dose of diltiazem prn  for afib
will discharge on Multaq and eliquis after tte  Protonix and Carafate for 1 month

## 2017-07-18 ENCOUNTER — APPOINTMENT (OUTPATIENT)
Dept: ELECTROPHYSIOLOGY | Facility: CLINIC | Age: 72
End: 2017-07-18

## 2017-07-18 ENCOUNTER — NON-APPOINTMENT (OUTPATIENT)
Age: 72
End: 2017-07-18

## 2017-07-18 VITALS
DIASTOLIC BLOOD PRESSURE: 80 MMHG | BODY MASS INDEX: 25.09 KG/M2 | WEIGHT: 165 LBS | OXYGEN SATURATION: 98 % | SYSTOLIC BLOOD PRESSURE: 129 MMHG | HEART RATE: 73 BPM

## 2017-09-12 ENCOUNTER — NON-APPOINTMENT (OUTPATIENT)
Age: 72
End: 2017-09-12

## 2017-09-12 ENCOUNTER — APPOINTMENT (OUTPATIENT)
Dept: ELECTROPHYSIOLOGY | Facility: CLINIC | Age: 72
End: 2017-09-12
Payer: MEDICARE

## 2017-09-12 VITALS — SYSTOLIC BLOOD PRESSURE: 136 MMHG | OXYGEN SATURATION: 99 % | DIASTOLIC BLOOD PRESSURE: 79 MMHG | HEART RATE: 94 BPM

## 2017-09-12 PROCEDURE — 93000 ELECTROCARDIOGRAM COMPLETE: CPT

## 2017-09-12 PROCEDURE — 99213 OFFICE O/P EST LOW 20 MIN: CPT

## 2017-10-02 ENCOUNTER — TRANSCRIPTION ENCOUNTER (OUTPATIENT)
Age: 72
End: 2017-10-02

## 2017-11-16 ENCOUNTER — APPOINTMENT (OUTPATIENT)
Dept: CARDIOLOGY | Facility: CLINIC | Age: 72
End: 2017-11-16
Payer: MEDICARE

## 2017-11-16 VITALS
BODY MASS INDEX: 25.46 KG/M2 | SYSTOLIC BLOOD PRESSURE: 108 MMHG | DIASTOLIC BLOOD PRESSURE: 70 MMHG | HEIGHT: 68 IN | WEIGHT: 168 LBS | HEART RATE: 86 BPM | OXYGEN SATURATION: 97 %

## 2017-11-16 DIAGNOSIS — N40.0 BENIGN PROSTATIC HYPERPLASIA WITHOUT LOWER URINARY TRACT SYMPMS: ICD-10-CM

## 2017-11-16 PROCEDURE — 99215 OFFICE O/P EST HI 40 MIN: CPT

## 2017-12-26 ENCOUNTER — RX RENEWAL (OUTPATIENT)
Age: 72
End: 2017-12-26

## 2018-03-20 ENCOUNTER — MEDICATION RENEWAL (OUTPATIENT)
Age: 73
End: 2018-03-20

## 2018-03-20 ENCOUNTER — NON-APPOINTMENT (OUTPATIENT)
Age: 73
End: 2018-03-20

## 2018-03-20 ENCOUNTER — APPOINTMENT (OUTPATIENT)
Dept: PULMONOLOGY | Facility: CLINIC | Age: 73
End: 2018-03-20
Payer: MEDICARE

## 2018-03-20 ENCOUNTER — APPOINTMENT (OUTPATIENT)
Dept: CT IMAGING | Facility: CLINIC | Age: 73
End: 2018-03-20

## 2018-03-20 ENCOUNTER — APPOINTMENT (OUTPATIENT)
Dept: ELECTROPHYSIOLOGY | Facility: CLINIC | Age: 73
End: 2018-03-20
Payer: MEDICARE

## 2018-03-20 VITALS
HEART RATE: 88 BPM | BODY MASS INDEX: 25.76 KG/M2 | WEIGHT: 170 LBS | OXYGEN SATURATION: 99 % | DIASTOLIC BLOOD PRESSURE: 82 MMHG | HEIGHT: 68 IN | SYSTOLIC BLOOD PRESSURE: 145 MMHG

## 2018-03-20 VITALS
WEIGHT: 168 LBS | BODY MASS INDEX: 32.98 KG/M2 | HEIGHT: 60 IN | SYSTOLIC BLOOD PRESSURE: 124 MMHG | HEART RATE: 80 BPM | OXYGEN SATURATION: 9 % | DIASTOLIC BLOOD PRESSURE: 70 MMHG

## 2018-03-20 DIAGNOSIS — Z78.9 OTHER SPECIFIED HEALTH STATUS: ICD-10-CM

## 2018-03-20 DIAGNOSIS — Z87.438 PERSONAL HISTORY OF OTHER DISEASES OF MALE GENITAL ORGANS: ICD-10-CM

## 2018-03-20 DIAGNOSIS — Z86.79 PERSONAL HISTORY OF OTHER DISEASES OF THE CIRCULATORY SYSTEM: ICD-10-CM

## 2018-03-20 DIAGNOSIS — Z77.090 CONTACT WITH AND (SUSPECTED) EXPOSURE TO ASBESTOS: ICD-10-CM

## 2018-03-20 DIAGNOSIS — Z87.09 PERSONAL HISTORY OF OTHER DISEASES OF THE RESPIRATORY SYSTEM: ICD-10-CM

## 2018-03-20 DIAGNOSIS — Z86.39 PERSONAL HISTORY OF OTHER ENDOCRINE, NUTRITIONAL AND METABOLIC DISEASE: ICD-10-CM

## 2018-03-20 PROCEDURE — 93000 ELECTROCARDIOGRAM COMPLETE: CPT

## 2018-03-20 PROCEDURE — 94727 GAS DIL/WSHOT DETER LNG VOL: CPT

## 2018-03-20 PROCEDURE — 94010 BREATHING CAPACITY TEST: CPT | Mod: 59

## 2018-03-20 PROCEDURE — 94729 DIFFUSING CAPACITY: CPT

## 2018-03-20 PROCEDURE — 71046 X-RAY EXAM CHEST 2 VIEWS: CPT

## 2018-03-20 PROCEDURE — 99205 OFFICE O/P NEW HI 60 MIN: CPT | Mod: 25

## 2018-03-20 PROCEDURE — 99214 OFFICE O/P EST MOD 30 MIN: CPT

## 2018-03-20 PROCEDURE — 94618 PULMONARY STRESS TESTING: CPT

## 2018-03-21 ENCOUNTER — APPOINTMENT (OUTPATIENT)
Dept: CARDIOLOGY | Facility: CLINIC | Age: 73
End: 2018-03-21
Payer: MEDICARE

## 2018-03-21 PROCEDURE — A9502: CPT

## 2018-03-21 PROCEDURE — 78452 HT MUSCLE IMAGE SPECT MULT: CPT

## 2018-03-21 PROCEDURE — 93015 CV STRESS TEST SUPVJ I&R: CPT

## 2018-03-29 ENCOUNTER — APPOINTMENT (OUTPATIENT)
Dept: CARDIOLOGY | Facility: CLINIC | Age: 73
End: 2018-03-29
Payer: MEDICARE

## 2018-03-29 VITALS
HEART RATE: 88 BPM | DIASTOLIC BLOOD PRESSURE: 70 MMHG | SYSTOLIC BLOOD PRESSURE: 128 MMHG | WEIGHT: 168 LBS | BODY MASS INDEX: 25.46 KG/M2 | OXYGEN SATURATION: 99 % | HEIGHT: 68 IN

## 2018-03-29 PROCEDURE — 99215 OFFICE O/P EST HI 40 MIN: CPT

## 2018-04-08 ENCOUNTER — FORM ENCOUNTER (OUTPATIENT)
Age: 73
End: 2018-04-08

## 2018-04-09 ENCOUNTER — OUTPATIENT (OUTPATIENT)
Dept: OUTPATIENT SERVICES | Facility: HOSPITAL | Age: 73
LOS: 1 days | End: 2018-04-09
Payer: MEDICARE

## 2018-04-09 ENCOUNTER — APPOINTMENT (OUTPATIENT)
Dept: CT IMAGING | Facility: IMAGING CENTER | Age: 73
End: 2018-04-09
Payer: MEDICARE

## 2018-04-09 DIAGNOSIS — Z90.49 ACQUIRED ABSENCE OF OTHER SPECIFIED PARTS OF DIGESTIVE TRACT: Chronic | ICD-10-CM

## 2018-04-09 DIAGNOSIS — J39.8 OTHER SPECIFIED DISEASES OF UPPER RESPIRATORY TRACT: ICD-10-CM

## 2018-04-09 PROCEDURE — 71250 CT THORAX DX C-: CPT

## 2018-04-09 PROCEDURE — 71250 CT THORAX DX C-: CPT | Mod: 26

## 2018-04-26 ENCOUNTER — OUTPATIENT (OUTPATIENT)
Dept: OUTPATIENT SERVICES | Facility: HOSPITAL | Age: 73
LOS: 1 days | End: 2018-04-26
Payer: MEDICARE

## 2018-04-26 ENCOUNTER — APPOINTMENT (OUTPATIENT)
Dept: THORACIC SURGERY | Facility: CLINIC | Age: 73
End: 2018-04-26
Payer: MEDICARE

## 2018-04-26 VITALS
OXYGEN SATURATION: 95 % | SYSTOLIC BLOOD PRESSURE: 134 MMHG | DIASTOLIC BLOOD PRESSURE: 80 MMHG | TEMPERATURE: 97.4 F | WEIGHT: 170 LBS | RESPIRATION RATE: 19 BRPM | HEART RATE: 90 BPM | BODY MASS INDEX: 25.85 KG/M2

## 2018-04-26 DIAGNOSIS — J39.8 OTHER SPECIFIED DISEASES OF UPPER RESPIRATORY TRACT: ICD-10-CM

## 2018-04-26 DIAGNOSIS — Z90.49 ACQUIRED ABSENCE OF OTHER SPECIFIED PARTS OF DIGESTIVE TRACT: Chronic | ICD-10-CM

## 2018-04-26 LAB
ALBUMIN SERPL ELPH-MCNC: 4.2 G/DL — SIGNIFICANT CHANGE UP (ref 3.3–5)
ALP SERPL-CCNC: 51 U/L — SIGNIFICANT CHANGE UP (ref 40–120)
ALT FLD-CCNC: 21 U/L — SIGNIFICANT CHANGE UP (ref 10–45)
ANION GAP SERPL CALC-SCNC: 11 MMOL/L — SIGNIFICANT CHANGE UP (ref 5–17)
APPEARANCE UR: CLEAR — SIGNIFICANT CHANGE UP
APTT BLD: 37.2 SEC — SIGNIFICANT CHANGE UP (ref 27.5–37.4)
AST SERPL-CCNC: 27 U/L — SIGNIFICANT CHANGE UP (ref 10–40)
BACTERIA # UR AUTO: PRESENT /HPF
BILIRUB SERPL-MCNC: 0.3 MG/DL — SIGNIFICANT CHANGE UP (ref 0.2–1.2)
BILIRUB UR-MCNC: NEGATIVE — SIGNIFICANT CHANGE UP
BLD GP AB SCN SERPL QL: NEGATIVE — SIGNIFICANT CHANGE UP
BLD GP AB SCN SERPL QL: NEGATIVE — SIGNIFICANT CHANGE UP
BUN SERPL-MCNC: 13 MG/DL — SIGNIFICANT CHANGE UP (ref 7–23)
CALCIUM SERPL-MCNC: 9.7 MG/DL — SIGNIFICANT CHANGE UP (ref 8.4–10.5)
CHLORIDE SERPL-SCNC: 103 MMOL/L — SIGNIFICANT CHANGE UP (ref 96–108)
CHOLEST SERPL-MCNC: 160 MG/DL — SIGNIFICANT CHANGE UP (ref 10–199)
CO2 SERPL-SCNC: 28 MMOL/L — SIGNIFICANT CHANGE UP (ref 22–31)
COLOR SPEC: YELLOW — SIGNIFICANT CHANGE UP
CREAT SERPL-MCNC: 0.81 MG/DL — SIGNIFICANT CHANGE UP (ref 0.5–1.3)
DIFF PNL FLD: NEGATIVE — SIGNIFICANT CHANGE UP
GLUCOSE SERPL-MCNC: 82 MG/DL — SIGNIFICANT CHANGE UP (ref 70–99)
GLUCOSE UR QL: NEGATIVE — SIGNIFICANT CHANGE UP
HBA1C BLD-MCNC: 5.3 % — SIGNIFICANT CHANGE UP (ref 4–5.6)
HBV SURFACE AG SER-ACNC: SIGNIFICANT CHANGE UP
HCT VFR BLD CALC: 43 % — SIGNIFICANT CHANGE UP (ref 39–50)
HDLC SERPL-MCNC: 75 MG/DL — SIGNIFICANT CHANGE UP (ref 40–125)
HGB BLD-MCNC: 14 G/DL — SIGNIFICANT CHANGE UP (ref 13–17)
INR BLD: 1.13 — SIGNIFICANT CHANGE UP (ref 0.88–1.16)
KETONES UR-MCNC: NEGATIVE — SIGNIFICANT CHANGE UP
LEUKOCYTE ESTERASE UR-ACNC: (no result)
LIPID PNL WITH DIRECT LDL SERPL: 69 MG/DL — SIGNIFICANT CHANGE UP
MCHC RBC-ENTMCNC: 31.5 PG — SIGNIFICANT CHANGE UP (ref 27–34)
MCHC RBC-ENTMCNC: 32.6 G/DL — SIGNIFICANT CHANGE UP (ref 32–36)
MCV RBC AUTO: 96.8 FL — SIGNIFICANT CHANGE UP (ref 80–100)
NITRITE UR-MCNC: NEGATIVE — SIGNIFICANT CHANGE UP
PH UR: 6.5 — SIGNIFICANT CHANGE UP (ref 5–8)
PLATELET # BLD AUTO: 233 K/UL — SIGNIFICANT CHANGE UP (ref 150–400)
POTASSIUM SERPL-MCNC: 4.6 MMOL/L — SIGNIFICANT CHANGE UP (ref 3.5–5.3)
POTASSIUM SERPL-SCNC: 4.6 MMOL/L — SIGNIFICANT CHANGE UP (ref 3.5–5.3)
PROT SERPL-MCNC: 6.8 G/DL — SIGNIFICANT CHANGE UP (ref 6–8.3)
PROT UR-MCNC: NEGATIVE MG/DL — SIGNIFICANT CHANGE UP
PROTHROM AB SERPL-ACNC: 12.6 SEC — SIGNIFICANT CHANGE UP (ref 9.8–12.7)
RBC # BLD: 4.44 M/UL — SIGNIFICANT CHANGE UP (ref 4.2–5.8)
RBC # FLD: 13.2 % — SIGNIFICANT CHANGE UP (ref 10.3–16.9)
RBC CASTS # UR COMP ASSIST: < 5 /HPF — SIGNIFICANT CHANGE UP
RH IG SCN BLD-IMP: POSITIVE — SIGNIFICANT CHANGE UP
RH IG SCN BLD-IMP: POSITIVE — SIGNIFICANT CHANGE UP
SODIUM SERPL-SCNC: 142 MMOL/L — SIGNIFICANT CHANGE UP (ref 135–145)
SP GR SPEC: 1.01 — SIGNIFICANT CHANGE UP (ref 1–1.03)
TOTAL CHOLESTEROL/HDL RATIO MEASUREMENT: 2.1 RATIO — LOW (ref 3.4–9.6)
TRIGL SERPL-MCNC: 78 MG/DL — SIGNIFICANT CHANGE UP (ref 10–149)
TSH SERPL-MCNC: 2.67 UIU/ML — SIGNIFICANT CHANGE UP (ref 0.35–4.94)
UROBILINOGEN FLD QL: 0.2 E.U./DL — SIGNIFICANT CHANGE UP
WBC # BLD: 6.7 K/UL — SIGNIFICANT CHANGE UP (ref 3.8–10.5)
WBC # FLD AUTO: 6.7 K/UL — SIGNIFICANT CHANGE UP (ref 3.8–10.5)
WBC UR QL: < 5 /HPF — SIGNIFICANT CHANGE UP

## 2018-04-26 PROCEDURE — 85027 COMPLETE CBC AUTOMATED: CPT

## 2018-04-26 PROCEDURE — 83036 HEMOGLOBIN GLYCOSYLATED A1C: CPT

## 2018-04-26 PROCEDURE — 87340 HEPATITIS B SURFACE AG IA: CPT

## 2018-04-26 PROCEDURE — 93005 ELECTROCARDIOGRAM TRACING: CPT

## 2018-04-26 PROCEDURE — 81001 URINALYSIS AUTO W/SCOPE: CPT

## 2018-04-26 PROCEDURE — 86900 BLOOD TYPING SEROLOGIC ABO: CPT

## 2018-04-26 PROCEDURE — 85730 THROMBOPLASTIN TIME PARTIAL: CPT

## 2018-04-26 PROCEDURE — 85610 PROTHROMBIN TIME: CPT

## 2018-04-26 PROCEDURE — 84443 ASSAY THYROID STIM HORMONE: CPT

## 2018-04-26 PROCEDURE — 86901 BLOOD TYPING SEROLOGIC RH(D): CPT

## 2018-04-26 PROCEDURE — 93010 ELECTROCARDIOGRAM REPORT: CPT

## 2018-04-26 PROCEDURE — 80053 COMPREHEN METABOLIC PANEL: CPT

## 2018-04-26 PROCEDURE — 86850 RBC ANTIBODY SCREEN: CPT

## 2018-04-26 PROCEDURE — 99205 OFFICE O/P NEW HI 60 MIN: CPT

## 2018-04-26 PROCEDURE — 80061 LIPID PANEL: CPT

## 2018-04-30 ENCOUNTER — APPOINTMENT (OUTPATIENT)
Dept: PULMONOLOGY | Facility: CLINIC | Age: 73
End: 2018-04-30
Payer: MEDICARE

## 2018-04-30 VITALS
WEIGHT: 170 LBS | BODY MASS INDEX: 25.76 KG/M2 | HEIGHT: 68 IN | OXYGEN SATURATION: 98 % | SYSTOLIC BLOOD PRESSURE: 124 MMHG | DIASTOLIC BLOOD PRESSURE: 80 MMHG | RESPIRATION RATE: 17 BRPM | HEART RATE: 83 BPM

## 2018-04-30 PROCEDURE — 99070 SPECIAL SUPPLIES PHYS/QHP: CPT

## 2018-04-30 PROCEDURE — 94010 BREATHING CAPACITY TEST: CPT

## 2018-04-30 PROCEDURE — 95012 NITRIC OXIDE EXP GAS DETER: CPT

## 2018-04-30 PROCEDURE — 99214 OFFICE O/P EST MOD 30 MIN: CPT | Mod: 25

## 2018-05-03 ENCOUNTER — APPOINTMENT (OUTPATIENT)
Dept: CARDIOLOGY | Facility: CLINIC | Age: 73
End: 2018-05-03
Payer: MEDICARE

## 2018-05-03 VITALS
WEIGHT: 170 LBS | SYSTOLIC BLOOD PRESSURE: 110 MMHG | HEART RATE: 94 BPM | BODY MASS INDEX: 25.76 KG/M2 | HEIGHT: 68 IN | DIASTOLIC BLOOD PRESSURE: 58 MMHG

## 2018-05-03 PROCEDURE — 99215 OFFICE O/P EST HI 40 MIN: CPT

## 2018-05-14 NOTE — ASU PATIENT PROFILE, ADULT - PMH
Asthma, Chronic    BPH (Benign Prostatic Hyperplasia)    CAD (Coronary Artery Disease)    Paroxysmal Atrial Fibrillation    Popliteal Aneurysm Aneurysm of popliteal artery    Asthma, Chronic    BPH (Benign Prostatic Hyperplasia)    CAD (Coronary Artery Disease)  5 stents  Paroxysmal Atrial Fibrillation    Popliteal Aneurysm

## 2018-05-14 NOTE — ASU PATIENT PROFILE, ADULT - PSH
Bladder Diverticulum    History of Hernia Repair    S/P cholecystectomy Bladder Diverticulum    Deviated septum    H/O cardiac radiofrequency ablation    H/O coronary angiogram    History of femoral angiogram    History of Hernia Repair    S/P cholecystectomy

## 2018-05-15 ENCOUNTER — APPOINTMENT (OUTPATIENT)
Dept: THORACIC SURGERY | Facility: HOSPITAL | Age: 73
End: 2018-05-15

## 2018-05-15 ENCOUNTER — OUTPATIENT (OUTPATIENT)
Dept: OUTPATIENT SERVICES | Facility: HOSPITAL | Age: 73
LOS: 1 days | Discharge: HOME | End: 2018-05-15
Payer: MEDICARE

## 2018-05-15 VITALS
HEIGHT: 68 IN | RESPIRATION RATE: 16 BRPM | WEIGHT: 169.98 LBS | HEART RATE: 78 BPM | OXYGEN SATURATION: 98 % | TEMPERATURE: 98 F | SYSTOLIC BLOOD PRESSURE: 122 MMHG | DIASTOLIC BLOOD PRESSURE: 68 MMHG

## 2018-05-15 VITALS
SYSTOLIC BLOOD PRESSURE: 126 MMHG | DIASTOLIC BLOOD PRESSURE: 71 MMHG | HEART RATE: 78 BPM | OXYGEN SATURATION: 96 % | RESPIRATION RATE: 18 BRPM

## 2018-05-15 DIAGNOSIS — Z98.890 OTHER SPECIFIED POSTPROCEDURAL STATES: Chronic | ICD-10-CM

## 2018-05-15 DIAGNOSIS — J34.2 DEVIATED NASAL SEPTUM: Chronic | ICD-10-CM

## 2018-05-15 DIAGNOSIS — Z90.49 ACQUIRED ABSENCE OF OTHER SPECIFIED PARTS OF DIGESTIVE TRACT: Chronic | ICD-10-CM

## 2018-05-15 DIAGNOSIS — Z86.79 PERSONAL HISTORY OF OTHER DISEASES OF THE CIRCULATORY SYSTEM: Chronic | ICD-10-CM

## 2018-05-15 PROCEDURE — 31622 DX BRONCHOSCOPE/WASH: CPT

## 2018-05-15 PROCEDURE — 31645 BRNCHSC W/THER ASPIR 1ST: CPT

## 2018-05-15 NOTE — BRIEF OPERATIVE NOTE - PROCEDURE
<<-----Click on this checkbox to enter Procedure Bronchoscopy  05/15/2018  Flexible, awake  Active  PMAINGON

## 2018-05-30 ENCOUNTER — APPOINTMENT (OUTPATIENT)
Dept: THORACIC SURGERY | Facility: CLINIC | Age: 73
End: 2018-05-30
Payer: MEDICARE

## 2018-05-30 VITALS
SYSTOLIC BLOOD PRESSURE: 127 MMHG | HEART RATE: 95 BPM | DIASTOLIC BLOOD PRESSURE: 77 MMHG | OXYGEN SATURATION: 97 % | TEMPERATURE: 97.7 F | RESPIRATION RATE: 20 BRPM | WEIGHT: 168 LBS | BODY MASS INDEX: 25.54 KG/M2

## 2018-05-30 PROCEDURE — 99214 OFFICE O/P EST MOD 30 MIN: CPT

## 2018-05-31 ENCOUNTER — APPOINTMENT (OUTPATIENT)
Dept: THORACIC SURGERY | Facility: CLINIC | Age: 73
End: 2018-05-31
Payer: MEDICARE

## 2018-07-19 ENCOUNTER — APPOINTMENT (OUTPATIENT)
Dept: THORACIC SURGERY | Facility: CLINIC | Age: 73
End: 2018-07-19
Payer: MEDICARE

## 2018-07-19 ENCOUNTER — OUTPATIENT (OUTPATIENT)
Dept: OUTPATIENT SERVICES | Facility: HOSPITAL | Age: 73
LOS: 1 days | End: 2018-07-19
Payer: MEDICARE

## 2018-07-19 VITALS
DIASTOLIC BLOOD PRESSURE: 67 MMHG | BODY MASS INDEX: 24.94 KG/M2 | RESPIRATION RATE: 19 BRPM | WEIGHT: 164 LBS | OXYGEN SATURATION: 97 % | SYSTOLIC BLOOD PRESSURE: 136 MMHG | TEMPERATURE: 97.2 F | HEART RATE: 82 BPM

## 2018-07-19 DIAGNOSIS — J34.2 DEVIATED NASAL SEPTUM: Chronic | ICD-10-CM

## 2018-07-19 DIAGNOSIS — Z86.79 PERSONAL HISTORY OF OTHER DISEASES OF THE CIRCULATORY SYSTEM: Chronic | ICD-10-CM

## 2018-07-19 DIAGNOSIS — Z01.818 ENCOUNTER FOR OTHER PREPROCEDURAL EXAMINATION: ICD-10-CM

## 2018-07-19 DIAGNOSIS — Z98.890 OTHER SPECIFIED POSTPROCEDURAL STATES: Chronic | ICD-10-CM

## 2018-07-19 DIAGNOSIS — Z90.49 ACQUIRED ABSENCE OF OTHER SPECIFIED PARTS OF DIGESTIVE TRACT: Chronic | ICD-10-CM

## 2018-07-19 PROBLEM — I72.4 ANEURYSM OF ARTERY OF LOWER EXTREMITY: Chronic | Status: ACTIVE | Noted: 2018-05-15

## 2018-07-19 LAB
ALBUMIN SERPL ELPH-MCNC: 4.3 G/DL — SIGNIFICANT CHANGE UP (ref 3.3–5)
ALP SERPL-CCNC: 51 U/L — SIGNIFICANT CHANGE UP (ref 40–120)
ALT FLD-CCNC: 28 U/L — SIGNIFICANT CHANGE UP (ref 10–45)
ANION GAP SERPL CALC-SCNC: 14 MMOL/L — SIGNIFICANT CHANGE UP (ref 5–17)
APPEARANCE UR: CLEAR — SIGNIFICANT CHANGE UP
APTT BLD: 36.9 SEC — SIGNIFICANT CHANGE UP (ref 27.5–37.4)
AST SERPL-CCNC: 28 U/L — SIGNIFICANT CHANGE UP (ref 10–40)
BILIRUB SERPL-MCNC: 0.3 MG/DL — SIGNIFICANT CHANGE UP (ref 0.2–1.2)
BILIRUB UR-MCNC: NEGATIVE — SIGNIFICANT CHANGE UP
BLD GP AB SCN SERPL QL: NEGATIVE — SIGNIFICANT CHANGE UP
BUN SERPL-MCNC: 21 MG/DL — SIGNIFICANT CHANGE UP (ref 7–23)
CALCIUM SERPL-MCNC: 9.5 MG/DL — SIGNIFICANT CHANGE UP (ref 8.4–10.5)
CHLORIDE SERPL-SCNC: 101 MMOL/L — SIGNIFICANT CHANGE UP (ref 96–108)
CHOLEST SERPL-MCNC: 168 MG/DL — SIGNIFICANT CHANGE UP (ref 10–199)
CO2 SERPL-SCNC: 28 MMOL/L — SIGNIFICANT CHANGE UP (ref 22–31)
COLOR SPEC: YELLOW — SIGNIFICANT CHANGE UP
CREAT SERPL-MCNC: 0.84 MG/DL — SIGNIFICANT CHANGE UP (ref 0.5–1.3)
DIFF PNL FLD: NEGATIVE — SIGNIFICANT CHANGE UP
GLUCOSE SERPL-MCNC: 97 MG/DL — SIGNIFICANT CHANGE UP (ref 70–99)
GLUCOSE UR QL: NEGATIVE — SIGNIFICANT CHANGE UP
HBA1C BLD-MCNC: 5.4 % — SIGNIFICANT CHANGE UP (ref 4–5.6)
HCT VFR BLD CALC: 43.8 % — SIGNIFICANT CHANGE UP (ref 39–50)
HDLC SERPL-MCNC: 81 MG/DL — SIGNIFICANT CHANGE UP (ref 40–125)
HGB BLD-MCNC: 13.7 G/DL — SIGNIFICANT CHANGE UP (ref 13–17)
INR BLD: 1.09 — SIGNIFICANT CHANGE UP (ref 0.88–1.16)
KETONES UR-MCNC: NEGATIVE — SIGNIFICANT CHANGE UP
LEUKOCYTE ESTERASE UR-ACNC: NEGATIVE — SIGNIFICANT CHANGE UP
LIPID PNL WITH DIRECT LDL SERPL: 71 MG/DL — SIGNIFICANT CHANGE UP
MCHC RBC-ENTMCNC: 31.1 PG — SIGNIFICANT CHANGE UP (ref 27–34)
MCHC RBC-ENTMCNC: 31.3 G/DL — LOW (ref 32–36)
MCV RBC AUTO: 99.3 FL — SIGNIFICANT CHANGE UP (ref 80–100)
NITRITE UR-MCNC: NEGATIVE — SIGNIFICANT CHANGE UP
PH UR: 6.5 — SIGNIFICANT CHANGE UP (ref 5–8)
PLATELET # BLD AUTO: 251 K/UL — SIGNIFICANT CHANGE UP (ref 150–400)
POTASSIUM SERPL-MCNC: 4.5 MMOL/L — SIGNIFICANT CHANGE UP (ref 3.5–5.3)
POTASSIUM SERPL-SCNC: 4.5 MMOL/L — SIGNIFICANT CHANGE UP (ref 3.5–5.3)
PROT SERPL-MCNC: 7 G/DL — SIGNIFICANT CHANGE UP (ref 6–8.3)
PROT UR-MCNC: NEGATIVE MG/DL — SIGNIFICANT CHANGE UP
PROTHROM AB SERPL-ACNC: 12.1 SEC — SIGNIFICANT CHANGE UP (ref 9.8–12.7)
RBC # BLD: 4.41 M/UL — SIGNIFICANT CHANGE UP (ref 4.2–5.8)
RBC # FLD: 13.7 % — SIGNIFICANT CHANGE UP (ref 10.3–16.9)
RH IG SCN BLD-IMP: POSITIVE — SIGNIFICANT CHANGE UP
SODIUM SERPL-SCNC: 143 MMOL/L — SIGNIFICANT CHANGE UP (ref 135–145)
SP GR SPEC: 1.01 — SIGNIFICANT CHANGE UP (ref 1–1.03)
TOTAL CHOLESTEROL/HDL RATIO MEASUREMENT: 2.1 RATIO — LOW (ref 3.4–9.6)
TRIGL SERPL-MCNC: 81 MG/DL — SIGNIFICANT CHANGE UP (ref 10–149)
TSH SERPL-MCNC: 2 UIU/ML — SIGNIFICANT CHANGE UP (ref 0.35–4.94)
UROBILINOGEN FLD QL: 0.2 E.U./DL — SIGNIFICANT CHANGE UP
WBC # BLD: 5.8 K/UL — SIGNIFICANT CHANGE UP (ref 3.8–10.5)
WBC # FLD AUTO: 5.8 K/UL — SIGNIFICANT CHANGE UP (ref 3.8–10.5)

## 2018-07-19 PROCEDURE — 93010 ELECTROCARDIOGRAM REPORT: CPT

## 2018-07-19 PROCEDURE — 99215 OFFICE O/P EST HI 40 MIN: CPT

## 2018-07-19 PROCEDURE — 84443 ASSAY THYROID STIM HORMONE: CPT

## 2018-07-19 PROCEDURE — 85730 THROMBOPLASTIN TIME PARTIAL: CPT

## 2018-07-19 PROCEDURE — 85027 COMPLETE CBC AUTOMATED: CPT

## 2018-07-19 PROCEDURE — 86901 BLOOD TYPING SEROLOGIC RH(D): CPT

## 2018-07-19 PROCEDURE — 80061 LIPID PANEL: CPT

## 2018-07-19 PROCEDURE — 86850 RBC ANTIBODY SCREEN: CPT

## 2018-07-19 PROCEDURE — 81003 URINALYSIS AUTO W/O SCOPE: CPT

## 2018-07-19 PROCEDURE — 80053 COMPREHEN METABOLIC PANEL: CPT

## 2018-07-19 PROCEDURE — 85610 PROTHROMBIN TIME: CPT

## 2018-07-19 PROCEDURE — 86900 BLOOD TYPING SEROLOGIC ABO: CPT

## 2018-07-19 PROCEDURE — 83036 HEMOGLOBIN GLYCOSYLATED A1C: CPT

## 2018-07-19 PROCEDURE — 93005 ELECTROCARDIOGRAM TRACING: CPT

## 2018-08-01 ENCOUNTER — APPOINTMENT (OUTPATIENT)
Dept: PULMONOLOGY | Facility: CLINIC | Age: 73
End: 2018-08-01
Payer: MEDICARE

## 2018-08-01 VITALS
HEART RATE: 74 BPM | WEIGHT: 167 LBS | HEIGHT: 68 IN | DIASTOLIC BLOOD PRESSURE: 80 MMHG | BODY MASS INDEX: 25.31 KG/M2 | OXYGEN SATURATION: 98 % | SYSTOLIC BLOOD PRESSURE: 122 MMHG

## 2018-08-01 PROCEDURE — 94618 PULMONARY STRESS TESTING: CPT

## 2018-08-01 PROCEDURE — 99214 OFFICE O/P EST MOD 30 MIN: CPT | Mod: 25

## 2018-08-01 PROCEDURE — 94727 GAS DIL/WSHOT DETER LNG VOL: CPT

## 2018-08-01 PROCEDURE — 94010 BREATHING CAPACITY TEST: CPT | Mod: 59

## 2018-08-01 PROCEDURE — ZZZZZ: CPT

## 2018-08-01 PROCEDURE — 94729 DIFFUSING CAPACITY: CPT

## 2018-08-02 ENCOUNTER — APPOINTMENT (OUTPATIENT)
Dept: CARDIOLOGY | Facility: CLINIC | Age: 73
End: 2018-08-02
Payer: MEDICARE

## 2018-08-02 VITALS
BODY MASS INDEX: 24.86 KG/M2 | HEART RATE: 85 BPM | SYSTOLIC BLOOD PRESSURE: 120 MMHG | DIASTOLIC BLOOD PRESSURE: 70 MMHG | OXYGEN SATURATION: 99 % | HEIGHT: 68 IN | WEIGHT: 164 LBS

## 2018-08-02 PROCEDURE — 99215 OFFICE O/P EST HI 40 MIN: CPT

## 2018-08-02 RX ORDER — MONTELUKAST SODIUM 10 MG/1
10 TABLET, FILM COATED ORAL
Qty: 1 | Refills: 1 | Status: DISCONTINUED | COMMUNITY
Start: 2018-04-30 | End: 2018-08-02

## 2018-08-02 RX ORDER — DILTIAZEM HYDROCHLORIDE 30 MG/1
30 TABLET ORAL EVERY 6 HOURS
Qty: 20 | Refills: 0 | Status: DISCONTINUED | COMMUNITY
Start: 2017-05-19 | End: 2018-08-02

## 2018-08-02 RX ORDER — ACLIDINIUM BROMIDE 400 UG/1
400 POWDER, METERED RESPIRATORY (INHALATION) TWICE DAILY
Qty: 3 | Refills: 1 | Status: DISCONTINUED | COMMUNITY
Start: 2018-04-30 | End: 2018-08-02

## 2018-08-13 VITALS
SYSTOLIC BLOOD PRESSURE: 161 MMHG | TEMPERATURE: 97 F | OXYGEN SATURATION: 99 % | HEART RATE: 78 BPM | DIASTOLIC BLOOD PRESSURE: 77 MMHG | WEIGHT: 159.39 LBS | HEIGHT: 68 IN | RESPIRATION RATE: 16 BRPM

## 2018-08-13 NOTE — PATIENT PROFILE ADULT. - PMH
Aneurysm of popliteal artery    Asthma, Chronic    BPH (Benign Prostatic Hyperplasia)    CAD (Coronary Artery Disease)  5 stents  Paroxysmal Atrial Fibrillation    Popliteal Aneurysm

## 2018-08-13 NOTE — PATIENT PROFILE ADULT. - PSH
Bladder Diverticulum    Deviated septum    H/O cardiac radiofrequency ablation    H/O coronary angiogram    History of femoral angiogram    History of Hernia Repair    S/P cholecystectomy

## 2018-08-14 ENCOUNTER — OTHER (OUTPATIENT)
Age: 73
End: 2018-08-14

## 2018-08-14 ENCOUNTER — RESULT REVIEW (OUTPATIENT)
Age: 73
End: 2018-08-14

## 2018-08-14 ENCOUNTER — APPOINTMENT (OUTPATIENT)
Dept: THORACIC SURGERY | Facility: HOSPITAL | Age: 73
End: 2018-08-14
Payer: MEDICARE

## 2018-08-14 ENCOUNTER — INPATIENT (INPATIENT)
Facility: HOSPITAL | Age: 73
LOS: 2 days | Discharge: HOME | DRG: 165 | End: 2018-08-17
Attending: SPECIALIST | Admitting: SPECIALIST
Payer: MEDICARE

## 2018-08-14 DIAGNOSIS — J34.2 DEVIATED NASAL SEPTUM: Chronic | ICD-10-CM

## 2018-08-14 DIAGNOSIS — J39.8 OTHER SPECIFIED DISEASES OF UPPER RESPIRATORY TRACT: ICD-10-CM

## 2018-08-14 DIAGNOSIS — Z90.49 ACQUIRED ABSENCE OF OTHER SPECIFIED PARTS OF DIGESTIVE TRACT: Chronic | ICD-10-CM

## 2018-08-14 DIAGNOSIS — Z98.890 OTHER SPECIFIED POSTPROCEDURAL STATES: Chronic | ICD-10-CM

## 2018-08-14 DIAGNOSIS — Z86.79 PERSONAL HISTORY OF OTHER DISEASES OF THE CIRCULATORY SYSTEM: Chronic | ICD-10-CM

## 2018-08-14 LAB
ANION GAP SERPL CALC-SCNC: 12 MMOL/L — SIGNIFICANT CHANGE UP (ref 5–17)
BLD GP AB SCN SERPL QL: NEGATIVE — SIGNIFICANT CHANGE UP
BUN SERPL-MCNC: 16 MG/DL — SIGNIFICANT CHANGE UP (ref 7–23)
CALCIUM SERPL-MCNC: 8.7 MG/DL — SIGNIFICANT CHANGE UP (ref 8.4–10.5)
CHLORIDE SERPL-SCNC: 102 MMOL/L — SIGNIFICANT CHANGE UP (ref 96–108)
CO2 SERPL-SCNC: 23 MMOL/L — SIGNIFICANT CHANGE UP (ref 22–31)
CREAT SERPL-MCNC: 0.81 MG/DL — SIGNIFICANT CHANGE UP (ref 0.5–1.3)
GAS PNL BLDA: SIGNIFICANT CHANGE UP
GLUCOSE SERPL-MCNC: 142 MG/DL — HIGH (ref 70–99)
HCT VFR BLD CALC: 40.3 % — SIGNIFICANT CHANGE UP (ref 39–50)
HGB BLD-MCNC: 13.5 G/DL — SIGNIFICANT CHANGE UP (ref 13–17)
MAGNESIUM SERPL-MCNC: 1.8 MG/DL — SIGNIFICANT CHANGE UP (ref 1.6–2.6)
MCHC RBC-ENTMCNC: 32 PG — SIGNIFICANT CHANGE UP (ref 27–34)
MCHC RBC-ENTMCNC: 33.5 G/DL — SIGNIFICANT CHANGE UP (ref 32–36)
MCV RBC AUTO: 95.5 FL — SIGNIFICANT CHANGE UP (ref 80–100)
PLATELET # BLD AUTO: 205 K/UL — SIGNIFICANT CHANGE UP (ref 150–400)
POTASSIUM SERPL-MCNC: 4.4 MMOL/L — SIGNIFICANT CHANGE UP (ref 3.5–5.3)
POTASSIUM SERPL-SCNC: 4.4 MMOL/L — SIGNIFICANT CHANGE UP (ref 3.5–5.3)
RBC # BLD: 4.22 M/UL — SIGNIFICANT CHANGE UP (ref 4.2–5.8)
RBC # FLD: 13.6 % — SIGNIFICANT CHANGE UP (ref 10.3–16.9)
RH IG SCN BLD-IMP: POSITIVE — SIGNIFICANT CHANGE UP
SODIUM SERPL-SCNC: 137 MMOL/L — SIGNIFICANT CHANGE UP (ref 135–145)
WBC # BLD: 11.9 K/UL — HIGH (ref 3.8–10.5)
WBC # FLD AUTO: 11.9 K/UL — HIGH (ref 3.8–10.5)

## 2018-08-14 PROCEDURE — S2900 ROBOTIC SURGICAL SYSTEM: CPT | Mod: NC

## 2018-08-14 PROCEDURE — 71045 X-RAY EXAM CHEST 1 VIEW: CPT | Mod: 26

## 2018-08-14 PROCEDURE — 31622 DX BRONCHOSCOPE/WASH: CPT

## 2018-08-14 PROCEDURE — 31760 TRACHEOPLASTY INTRATHORACIC: CPT

## 2018-08-14 PROCEDURE — 31760 TRACHEOPLASTY INTRATHORACIC: CPT | Mod: 80

## 2018-08-14 PROCEDURE — 32674 THORACOSCOPY LYMPH NODE EXC: CPT

## 2018-08-14 PROCEDURE — 31770 REPAIR/GRAFT OF BRONCHUS: CPT | Mod: RT

## 2018-08-14 PROCEDURE — 32674 THORACOSCOPY LYMPH NODE EXC: CPT | Mod: 80

## 2018-08-14 PROCEDURE — 31770 REPAIR/GRAFT OF BRONCHUS: CPT | Mod: 80,59

## 2018-08-14 RX ORDER — BUPIVACAINE 13.3 MG/ML
20 INJECTION, SUSPENSION, LIPOSOMAL INFILTRATION ONCE
Qty: 0 | Refills: 0 | Status: DISCONTINUED | OUTPATIENT
Start: 2018-08-14 | End: 2018-08-17

## 2018-08-14 RX ORDER — FENTANYL CITRATE 50 UG/ML
12.5 INJECTION INTRAVENOUS ONCE
Qty: 0 | Refills: 0 | Status: DISCONTINUED | OUTPATIENT
Start: 2018-08-14 | End: 2018-08-14

## 2018-08-14 RX ORDER — SODIUM CHLORIDE 9 MG/ML
1000 INJECTION, SOLUTION INTRAVENOUS
Qty: 0 | Refills: 0 | Status: DISCONTINUED | OUTPATIENT
Start: 2018-08-14 | End: 2018-08-15

## 2018-08-14 RX ORDER — CEFAZOLIN SODIUM 1 G
2000 VIAL (EA) INJECTION EVERY 8 HOURS
Qty: 0 | Refills: 0 | Status: COMPLETED | OUTPATIENT
Start: 2018-08-14 | End: 2018-08-15

## 2018-08-14 RX ORDER — CEFAZOLIN SODIUM 1 G
2000 VIAL (EA) INJECTION EVERY 8 HOURS
Qty: 0 | Refills: 0 | Status: DISCONTINUED | OUTPATIENT
Start: 2018-08-14 | End: 2018-08-14

## 2018-08-14 RX ORDER — ACETAMINOPHEN 500 MG
1000 TABLET ORAL ONCE
Qty: 0 | Refills: 0 | Status: COMPLETED | OUTPATIENT
Start: 2018-08-14 | End: 2018-08-14

## 2018-08-14 RX ADMIN — Medication 1000 MILLIGRAM(S): at 20:08

## 2018-08-14 RX ADMIN — Medication 2000 MILLIGRAM(S): at 22:19

## 2018-08-14 RX ADMIN — FENTANYL CITRATE 12.5 MICROGRAM(S): 50 INJECTION INTRAVENOUS at 23:46

## 2018-08-14 RX ADMIN — SODIUM CHLORIDE 75 MILLILITER(S): 9 INJECTION, SOLUTION INTRAVENOUS at 22:19

## 2018-08-14 RX ADMIN — SODIUM CHLORIDE 75 MILLILITER(S): 9 INJECTION, SOLUTION INTRAVENOUS at 19:23

## 2018-08-14 RX ADMIN — Medication 400 MILLIGRAM(S): at 19:53

## 2018-08-14 NOTE — PROGRESS NOTE ADULT - ASSESSMENT
Problems  1. s/p tracheobronchoplasty  2. Subcutaneous emphysema  3. Resp insufficiency post extubation  4. Post op pain    Plan   Neuro -- pain control  CVS - hemodynamic support, monitor for arrhythmia.   Pt has history of CAD and PAF, monitor closely and resume  Eliquis  Pulm - post extubation monitor resp status  chest tube management  monitor for progression of subcutaneous air  resume Asthma meds   GI - GI proph,, protonix    - monitor UOP  H/o BPH tran management  Endo - glycemic control  Heme -  monitor for bleeding  ID - periop antibiotics.       Critical post op.    Critical care time spent 40 min

## 2018-08-14 NOTE — H&P ADULT - PMH
Aneurysm of popliteal artery    Asthma, Chronic    BPH (Benign Prostatic Hyperplasia)    CAD (Coronary Artery Disease)  5 stents  Paroxysmal Atrial Fibrillation    Popliteal Aneurysm Aneurysm of popliteal artery    Asthma, Chronic    BPH (Benign Prostatic Hyperplasia)    CAD (Coronary Artery Disease)  5 stents  Paroxysmal Atrial Fibrillation    Popliteal Aneurysm    Tracheobronchomalacia

## 2018-08-14 NOTE — H&P ADULT - ASSESSMENT
This is a 71 y/o male with PMHx HLD, asthma, BPH, CAD s/p coronary stents x 5, afib on Eliquis (last dose 8/8/18), popliteal aneurysm s/p bypass, who initially presented with chronic cough.  He was found to have tracheobronchomalacia and was worked up as an out-patient.  He is here today for elective surgery.

## 2018-08-14 NOTE — H&P ADULT - NSHPPHYSICALEXAM_GEN_ALL_CORE
GEN: NAD, looks comfortable  Psych: Mood appropriate  Neuro: A&Ox3.  No focal deficits.  Moving all extremities.   HEENT: No obvious abnormalities  CV: S1S2, regular, no murmurs appreciated.  No carotid bruits.  No JVD  Lungs: Clear B/L.  No wheezing, rales or rhonchi  ABD: Soft, non-tender, non-distended.  +Bowel sounds  EXT: Warm and well perfused.  No peripheral edema noted  Musculoskeletal: Moving all extremities with normal ROM, no joint swelling  PV: Pedal pulses palpable VS: 120/70  HR 85NSR   99% O2 sat.   GEN: NAD, looks comfortable  Psych: Mood appropriate  Neuro: A&Ox3.  No focal deficits.  Moving all extremities.   HEENT: No obvious abnormalities  CV: S1S2, regular, no murmurs appreciated.  No carotid bruits.  No JVD  Lungs: Clear B/L.  No wheezing, rales or rhonchi  ABD: Soft, non-tender, non-distended.  +Bowel sounds  EXT: Warm and well perfused.  No peripheral edema noted  Musculoskeletal: Moving all extremities with normal ROM, no joint swelling  PV: Pedal pulses palpable

## 2018-08-14 NOTE — H&P ADULT - NSHPLABSRESULTS_GEN_ALL_CORE
Pre-operative testing done out-patient and reviewed.  Please refer to [facility] and paper chart for results/reports.

## 2018-08-14 NOTE — H&P ADULT - HISTORY OF PRESENT ILLNESS
Patient seen in same day holding area; Reports no changes to PMHx or medications since last seen by our team. Denies acute or current SOB, chest pain, palpitation, N/V/D, fever/chills, recent illness, or any other concerning symptoms. This is a 71 y/o male with PMHx HLD, asthma, BPH, CAD s/p coronary stents x 5, afib on Eliquis (last dose 8/8/18), popliteal aneurysm s/p bypass, who initially presented with chronic cough.  He was found to have tracheobronchomalacia and was worked up as an out-patient.  He is here today for elective surgery.      Patient seen in same day holding area; Reports no changes to PMHx or medications since last seen by our team. Denies acute or current SOB, chest pain, palpitation, N/V/D, fever/chills, recent illness, or any other concerning symptoms.

## 2018-08-14 NOTE — H&P ADULT - PROBLEM SELECTOR PLAN 1
Admit under Dr. Solo via same day surgery for tracheobronchoplasty. Consent signed, placed on chart.  Risks/benefits reviewed, patient understands and agrees. T&S ordered and blood products placed on hold for OR.  To 9E/LA post-op.

## 2018-08-14 NOTE — H&P ADULT - NSHPREVIEWOFSYSTEMS_GEN_ALL_CORE
Review of Systems  CONSTITUTIONAL:  Denies Fevers / chills, sweats, fatigue, weight loss, weight gain                                      NEURO:  Denies parethesias, seizures, syncope, confusion                                                                                EYES:  Denies Blurry vision, discharge, pain, loss of vision                                                                                    ENMT:  Denies Difficulty hearing, vertigo, dysphagia, epistaxis, recent dental work                                       CV:  Denies Chest pain, palpitations, MOREL, orthopnea                                                                                          RESPIRATORY:  Denies Wheezing, SOB, cough / sputum, hemoptysis                                                                GI:  Denies Nausea, vomiting, diarrhea, constipation, melena, difficulty swallowing                                               : Denies Hematuria, dysuria, urgency, incontinence                                                                                         MUSCULOSKELETAL:  Denies arthritis, joint swelling, muscle weakness                                                             SKIN/BREAST:  Denies rash, itching, hair loss, masses                                                                                            PSYCH:  Denies depression, anxiety, suicidal ideation                                                                                               HEME/LYMPH:  Denies bruises easily, enlarged lymph nodes, tender lymph nodes                                        ENDOCRINE:  Denies cold intolerance, heat intolerance, polydipsia Review of Systems  CONSTITUTIONAL:  Denies Fevers / chills, sweats, fatigue, weight loss, weight gain                                      NEURO:  Denies parethesias, seizures, syncope, confusion                                                                                EYES:  Denies Blurry vision, discharge, pain, loss of vision                                                                                    ENMT:  Denies Difficulty hearing, vertigo, dysphagia, epistaxis, recent dental work                                       CV:  Denies Chest pain, palpitations, MOREL, orthopnea                                                                                          RESPIRATORY:  +cough Denies Wheezing, SOB, sputum, hemoptysis                                                                GI:  Denies Nausea, vomiting, diarrhea, constipation, melena, difficulty swallowing                                               : Denies Hematuria, dysuria, urgency, incontinence                                                                                         MUSCULOSKELETAL:  Denies arthritis, joint swelling, muscle weakness                                                             SKIN/BREAST:  Denies rash, itching, hair loss, masses                                                                                            PSYCH:  Denies depression, anxiety, suicidal ideation                                                                                               HEME/LYMPH:  Denies bruises easily, enlarged lymph nodes, tender lymph nodes                                        ENDOCRINE:  Denies cold intolerance, heat intolerance, polydipsia

## 2018-08-14 NOTE — PROGRESS NOTE ADULT - SUBJECTIVE AND OBJECTIVE BOX
s/p Tracheobronchoplasty  Arrived extubated    71yo with history of trachobronchomalacia,  PAF on eliquis, CAD prior stents, HTN s/p uncomplicated R VATS, tracheobronchoplasty        PMH :  Tracheobronchomalacia  Aneurysm of popliteal artery  Paroxysmal Atrial Fibrillation  BPH (Benign Prostatic Hyperplasia)  Asthma, Chronic  CAD (Coronary Artery Disease)  Bronchoplasty  Deviated septum  H/O coronary angiogram  H/O cardiac radiofrequency ablation  S/P cholecystectomy  Bladder Diverticulum  History of Hernia Repair    ROS c/o chest pain and headache  All other systems reviewed and negative.    ICU Vital Signs Last 24 Hrs  T(C): 36.2 (08-14-18 @ 22:24), Max: 36.5 (08-14-18 @ 19:15)  T(F): 97.2 (08-14-18 @ 22:24), Max: 97.7 (08-14-18 @ 19:15)  HR: 72 (08-15-18 @ 03:00) (72 - 78)  BP: 141/78 (08-14-18 @ 19:00) (141/78 - 141/78)  BP(mean): 101 (08-14-18 @ 19:00) (101 - 101)  ABP: 116/46 (08-15-18 @ 03:00) (104/42 - 170/76)  ABP(mean): 72 (08-15-18 @ 03:00) (64 - 114)  RR: 12 (08-15-18 @ 03:00) (11 - 40)  SpO2: 100% (08-15-18 @ 03:00) (97% - 100%)    I&O's Summary    14 Aug 2018 07:01  -  15 Aug 2018 03:53  --------------------------------------------------------  IN: 675 mL / OUT: 620 mL / NET: 55 mL        ABG - ( 14 Aug 2018 19:14 )  pH: 7.38  /  pCO2: 40    /  pO2: 117   / HCO3: 23    / Base Excess: -1.9  /  SaO2: 98                              13.5   11.9  )-----------( 205      ( 14 Aug 2018 19:06 )             40.3     14 Aug 2018 19:06    137    |  102    |  16     ----------------------------<  142    4.4     |  23     |  0.81     Ca    8.7        14 Aug 2018 19:06  Mg     1.8       14 Aug 2018 19:06    MEDICATIONS  (STANDING):  BUpivacaine liposome 1.3% Injectable (no eMAR) 20 milliLiter(s) Local Injection once  ceFAZolin  Injectable. 2000 milliGRAM(s) IV Push every 8 hours  lactated ringers. 1000 milliLiter(s) IV Continuous <Continuous>    Home Medications:  Dulera 100 mcg-5 mcg/inh inhalation aerosol: 2 puff(s) inhaled 2 times a day (14 Aug 2018 10:18)  Eliquis 5 mg oral tablet: 1 tab(s) orally 2 times a day (14 Aug 2018 10:18)  simvastatin 40 mg oral tablet: 1 tab(s) orally once a day (at bedtime) (14 Aug 2018 10:18)  Singulair 10 mg oral tablet: 1 tab(s) orally once a day, As Needed (14 Aug 2018 10:18)  Tudorza Pressair 400 mcg/inh inhalation powder: inhaled 2 times a day 1 puff (14 Aug 2018 10:18)  Ventolin HFA 90 mcg/inh inhalation aerosol: 2 puff(s) inhaled 4 times a day, As Needed (14 Aug 2018 10:18)    PHYSICAL EXAM:  Gen : no acute distress  Neck: No LAD, No JVD  Respiratory: decreased in the bases, subcutaneous emphysema noted along left chest around pec muscle  chest tube no drainage or air leak  Cardiovascular: S1 and S2, RRR, no M/G/R  Gastrointestinal: BS+, soft, NT/ND  Extremities: No peripheral edema  Vascular: 2+ peripheral pulses  Neurological: A/O x 3, no focal deficits  Incision: clean dry/ no sign of infection  Lines: no sign of infection

## 2018-08-15 LAB
ALBUMIN SERPL ELPH-MCNC: 3.6 G/DL — SIGNIFICANT CHANGE UP (ref 3.3–5)
ALP SERPL-CCNC: 46 U/L — SIGNIFICANT CHANGE UP (ref 40–120)
ALT FLD-CCNC: 59 U/L — HIGH (ref 10–45)
ANION GAP SERPL CALC-SCNC: 11 MMOL/L — SIGNIFICANT CHANGE UP (ref 5–17)
APTT BLD: 31.4 SEC — SIGNIFICANT CHANGE UP (ref 27.5–37.4)
AST SERPL-CCNC: 60 U/L — HIGH (ref 10–40)
BILIRUB SERPL-MCNC: 0.5 MG/DL — SIGNIFICANT CHANGE UP (ref 0.2–1.2)
BUN SERPL-MCNC: 14 MG/DL — SIGNIFICANT CHANGE UP (ref 7–23)
CALCIUM SERPL-MCNC: 9.6 MG/DL — SIGNIFICANT CHANGE UP (ref 8.4–10.5)
CHLORIDE SERPL-SCNC: 101 MMOL/L — SIGNIFICANT CHANGE UP (ref 96–108)
CO2 SERPL-SCNC: 28 MMOL/L — SIGNIFICANT CHANGE UP (ref 22–31)
CREAT SERPL-MCNC: 0.96 MG/DL — SIGNIFICANT CHANGE UP (ref 0.5–1.3)
GLUCOSE SERPL-MCNC: 114 MG/DL — HIGH (ref 70–99)
HCT VFR BLD CALC: 42.2 % — SIGNIFICANT CHANGE UP (ref 39–50)
HGB BLD-MCNC: 13.4 G/DL — SIGNIFICANT CHANGE UP (ref 13–17)
INR BLD: 1.04 — SIGNIFICANT CHANGE UP (ref 0.88–1.16)
MAGNESIUM SERPL-MCNC: 1.9 MG/DL — SIGNIFICANT CHANGE UP (ref 1.6–2.6)
MCHC RBC-ENTMCNC: 31.4 PG — SIGNIFICANT CHANGE UP (ref 27–34)
MCHC RBC-ENTMCNC: 31.8 G/DL — LOW (ref 32–36)
MCV RBC AUTO: 98.8 FL — SIGNIFICANT CHANGE UP (ref 80–100)
PHOSPHATE SERPL-MCNC: 4.8 MG/DL — HIGH (ref 2.5–4.5)
PLATELET # BLD AUTO: 197 K/UL — SIGNIFICANT CHANGE UP (ref 150–400)
POTASSIUM SERPL-MCNC: 4.9 MMOL/L — SIGNIFICANT CHANGE UP (ref 3.5–5.3)
POTASSIUM SERPL-SCNC: 4.9 MMOL/L — SIGNIFICANT CHANGE UP (ref 3.5–5.3)
PROT SERPL-MCNC: 6.3 G/DL — SIGNIFICANT CHANGE UP (ref 6–8.3)
PROTHROM AB SERPL-ACNC: 11.6 SEC — SIGNIFICANT CHANGE UP (ref 9.8–12.7)
RBC # BLD: 4.27 M/UL — SIGNIFICANT CHANGE UP (ref 4.2–5.8)
RBC # FLD: 13.8 % — SIGNIFICANT CHANGE UP (ref 10.3–16.9)
SODIUM SERPL-SCNC: 140 MMOL/L — SIGNIFICANT CHANGE UP (ref 135–145)
WBC # BLD: 9 K/UL — SIGNIFICANT CHANGE UP (ref 3.8–10.5)
WBC # FLD AUTO: 9 K/UL — SIGNIFICANT CHANGE UP (ref 3.8–10.5)

## 2018-08-15 PROCEDURE — 71045 X-RAY EXAM CHEST 1 VIEW: CPT | Mod: 26,77

## 2018-08-15 PROCEDURE — 71045 X-RAY EXAM CHEST 1 VIEW: CPT | Mod: 26

## 2018-08-15 RX ORDER — ASPIRIN/CALCIUM CARB/MAGNESIUM 324 MG
81 TABLET ORAL DAILY
Qty: 0 | Refills: 0 | Status: DISCONTINUED | OUTPATIENT
Start: 2018-08-15 | End: 2018-08-17

## 2018-08-15 RX ORDER — DOCUSATE SODIUM 100 MG
100 CAPSULE ORAL THREE TIMES A DAY
Qty: 0 | Refills: 0 | Status: DISCONTINUED | OUTPATIENT
Start: 2018-08-15 | End: 2018-08-17

## 2018-08-15 RX ORDER — PANTOPRAZOLE SODIUM 20 MG/1
40 TABLET, DELAYED RELEASE ORAL
Qty: 0 | Refills: 0 | Status: DISCONTINUED | OUTPATIENT
Start: 2018-08-15 | End: 2018-08-17

## 2018-08-15 RX ORDER — HEPARIN SODIUM 5000 [USP'U]/ML
5000 INJECTION INTRAVENOUS; SUBCUTANEOUS EVERY 8 HOURS
Qty: 0 | Refills: 0 | Status: DISCONTINUED | OUTPATIENT
Start: 2018-08-15 | End: 2018-08-16

## 2018-08-15 RX ORDER — IPRATROPIUM/ALBUTEROL SULFATE 18-103MCG
3 AEROSOL WITH ADAPTER (GRAM) INHALATION EVERY 6 HOURS
Qty: 0 | Refills: 0 | Status: DISCONTINUED | OUTPATIENT
Start: 2018-08-15 | End: 2018-08-17

## 2018-08-15 RX ORDER — ACETAMINOPHEN 500 MG
1000 TABLET ORAL ONCE
Qty: 0 | Refills: 0 | Status: COMPLETED | OUTPATIENT
Start: 2018-08-15 | End: 2018-08-15

## 2018-08-15 RX ORDER — FENTANYL CITRATE 50 UG/ML
12.5 INJECTION INTRAVENOUS ONCE
Qty: 0 | Refills: 0 | Status: DISCONTINUED | OUTPATIENT
Start: 2018-08-15 | End: 2018-08-15

## 2018-08-15 RX ORDER — POLYETHYLENE GLYCOL 3350 17 G/17G
17 POWDER, FOR SOLUTION ORAL DAILY
Qty: 0 | Refills: 0 | Status: DISCONTINUED | OUTPATIENT
Start: 2018-08-15 | End: 2018-08-17

## 2018-08-15 RX ORDER — SODIUM CHLORIDE 9 MG/ML
3 INJECTION INTRAMUSCULAR; INTRAVENOUS; SUBCUTANEOUS EVERY 8 HOURS
Qty: 0 | Refills: 0 | Status: DISCONTINUED | OUTPATIENT
Start: 2018-08-15 | End: 2018-08-17

## 2018-08-15 RX ORDER — SIMVASTATIN 20 MG/1
20 TABLET, FILM COATED ORAL AT BEDTIME
Qty: 0 | Refills: 0 | Status: DISCONTINUED | OUTPATIENT
Start: 2018-08-15 | End: 2018-08-17

## 2018-08-15 RX ORDER — ACETAMINOPHEN 500 MG
650 TABLET ORAL EVERY 6 HOURS
Qty: 0 | Refills: 0 | Status: DISCONTINUED | OUTPATIENT
Start: 2018-08-15 | End: 2018-08-17

## 2018-08-15 RX ORDER — ACETAMINOPHEN 500 MG
1000 TABLET ORAL ONCE
Qty: 0 | Refills: 0 | Status: DISCONTINUED | OUTPATIENT
Start: 2018-08-15 | End: 2018-08-17

## 2018-08-15 RX ORDER — BUDESONIDE, MICRONIZED 100 %
0.25 POWDER (GRAM) MISCELLANEOUS EVERY 12 HOURS
Qty: 0 | Refills: 0 | Status: DISCONTINUED | OUTPATIENT
Start: 2018-08-15 | End: 2018-08-17

## 2018-08-15 RX ORDER — SENNA PLUS 8.6 MG/1
2 TABLET ORAL AT BEDTIME
Qty: 0 | Refills: 0 | Status: DISCONTINUED | OUTPATIENT
Start: 2018-08-15 | End: 2018-08-17

## 2018-08-15 RX ORDER — ALBUTEROL 90 UG/1
1 AEROSOL, METERED ORAL EVERY 4 HOURS
Qty: 0 | Refills: 0 | Status: DISCONTINUED | OUTPATIENT
Start: 2018-08-15 | End: 2018-08-17

## 2018-08-15 RX ORDER — TIOTROPIUM BROMIDE 18 UG/1
1 CAPSULE ORAL; RESPIRATORY (INHALATION) DAILY
Qty: 0 | Refills: 0 | Status: DISCONTINUED | OUTPATIENT
Start: 2018-08-15 | End: 2018-08-17

## 2018-08-15 RX ADMIN — FENTANYL CITRATE 12.5 MICROGRAM(S): 50 INJECTION INTRAVENOUS at 08:14

## 2018-08-15 RX ADMIN — Medication 2000 MILLIGRAM(S): at 13:04

## 2018-08-15 RX ADMIN — SODIUM CHLORIDE 3 MILLILITER(S): 9 INJECTION INTRAMUSCULAR; INTRAVENOUS; SUBCUTANEOUS at 12:35

## 2018-08-15 RX ADMIN — SENNA PLUS 2 TABLET(S): 8.6 TABLET ORAL at 21:48

## 2018-08-15 RX ADMIN — Medication 3 MILLILITER(S): at 19:00

## 2018-08-15 RX ADMIN — Medication 400 MILLIGRAM(S): at 21:50

## 2018-08-15 RX ADMIN — SODIUM CHLORIDE 3 MILLILITER(S): 9 INJECTION INTRAMUSCULAR; INTRAVENOUS; SUBCUTANEOUS at 21:27

## 2018-08-15 RX ADMIN — Medication 100 MILLIGRAM(S): at 21:47

## 2018-08-15 RX ADMIN — PANTOPRAZOLE SODIUM 40 MILLIGRAM(S): 20 TABLET, DELAYED RELEASE ORAL at 06:08

## 2018-08-15 RX ADMIN — Medication 3 MILLILITER(S): at 11:32

## 2018-08-15 RX ADMIN — SIMVASTATIN 20 MILLIGRAM(S): 20 TABLET, FILM COATED ORAL at 21:47

## 2018-08-15 RX ADMIN — Medication 81 MILLIGRAM(S): at 13:04

## 2018-08-15 RX ADMIN — Medication 1000 MILLIGRAM(S): at 22:06

## 2018-08-15 RX ADMIN — Medication 2000 MILLIGRAM(S): at 06:08

## 2018-08-15 RX ADMIN — Medication 100 MILLIGRAM(S): at 13:05

## 2018-08-15 RX ADMIN — Medication 0.25 MILLIGRAM(S): at 19:00

## 2018-08-15 RX ADMIN — HEPARIN SODIUM 5000 UNIT(S): 5000 INJECTION INTRAVENOUS; SUBCUTANEOUS at 21:48

## 2018-08-15 RX ADMIN — FENTANYL CITRATE 12.5 MICROGRAM(S): 50 INJECTION INTRAVENOUS at 06:08

## 2018-08-15 RX ADMIN — HEPARIN SODIUM 5000 UNIT(S): 5000 INJECTION INTRAVENOUS; SUBCUTANEOUS at 13:05

## 2018-08-15 RX ADMIN — FENTANYL CITRATE 12.5 MICROGRAM(S): 50 INJECTION INTRAVENOUS at 00:16

## 2018-08-15 NOTE — PROGRESS NOTE ADULT - SUBJECTIVE AND OBJECTIVE BOX
9E to 9L transfer/acceptance note     Operation / Date: 8/14/18 R VATS tracheobronchoplasty     SUBJECTIVE ASSESSMENT:  Patient seen this afternoon at bedside, doing well and not offering any complaints at this time. Denies any chest pain, shortness of breath or palpitations.     Vital Signs Last 24 Hrs  T(C): 36.8 (15 Aug 2018 14:10), Max: 36.9 (15 Aug 2018 11:10)  T(F): 98.3 (15 Aug 2018 14:10), Max: 98.5 (15 Aug 2018 11:10)  HR: 84 (15 Aug 2018 14:10) (70 - 96)  BP: 116/62 (15 Aug 2018 14:10) (114/62 - 141/78)  BP(mean): 76 (15 Aug 2018 14:10) (76 - 101)  RR: 18 (15 Aug 2018 14:10) (11 - 40)  SpO2: 97% (15 Aug 2018 14:10) (96% - 100%)  I&O's Detail    14 Aug 2018 07:01  -  15 Aug 2018 07:00  --------------------------------------------------------  IN:    lactated ringers.: 975 mL  Total IN: 975 mL    OUT:    Chest Tube: 35 mL    Indwelling Catheter - Urethral: 770 mL  Total OUT: 805 mL    Total NET: 170 mL      15 Aug 2018 07:01  -  15 Aug 2018 15:39  --------------------------------------------------------  IN:    lactated ringers.: 75 mL    Oral Fluid: 700 mL  Total IN: 775 mL    OUT:    Indwelling Catheter - Urethral: 105 mL    Voided: 160 mL  Total OUT: 265 mL    Total NET: 510 mL          CHEST TUBE:  No  ELVER DRAIN: No  EPICARDIAL WIRES: No  TIE DOWNS: Yes  CORREA: No  PHYSICAL EXAM:    General: Patient sitting comfortably in a chair, no acute distress     Neurological: Alert and oriented. No focal neurological deficits     Cardiovascular: S1S2, RRR, no murmurs appreciated on exam     Respiratory: Clear to ausculation bilaterally     Gastrointestinal: Abdomen soft, non tender, non distended     Extremities: Warm and well perfused. No edema or calf tenderness     Vascular: Peripheral pulses 2+ bilaterally     Incision Sites: R VATS incisions C/D/I, no drainage or surrounding erythema   Chest tube site covered with occlusive dressing     LABS:                        13.4   9.0   )-----------( 197      ( 15 Aug 2018 03:27 )             42.2       COUMADIN: No    PT/INR - ( 15 Aug 2018 03:27 )   PT: 11.6 sec;   INR: 1.04          PTT - ( 15 Aug 2018 03:27 )  PTT:31.4 sec    08-15    140  |  101  |  14  ----------------------------<  114<H>  4.9   |  28  |  0.96    Ca    9.6      15 Aug 2018 03:27  Phos  4.8     08-15  Mg     1.9     08-15    TPro  6.3  /  Alb  3.6  /  TBili  0.5  /  DBili  x   /  AST  60<H>  /  ALT  59<H>  /  AlkPhos  46  08-15    MEDICATIONS  (STANDING):  acetaminophen  IVPB. 1000 milliGRAM(s) IV Intermittent once  ALBUTerol    90 MICROgram(s) HFA Inhaler 1 Puff(s) Inhalation every 4 hours  ALBUTerol/ipratropium for Nebulization 3 milliLiter(s) Nebulizer every 6 hours  aspirin enteric coated 81 milliGRAM(s) Oral daily  buDESOnide   0.25 milliGRAM(s) Respule 0.25 milliGRAM(s) Inhalation every 12 hours  docusate sodium 100 milliGRAM(s) Oral three times a day  heparin  Injectable 5000 Unit(s) SubCutaneous every 8 hours  pantoprazole    Tablet 40 milliGRAM(s) Oral before breakfast  senna 2 Tablet(s) Oral at bedtime  simvastatin 20 milliGRAM(s) Oral at bedtime  tiotropium 18 MICROgram(s) Capsule 1 Capsule(s) Inhalation daily    MEDICATIONS  (PRN):  acetaminophen   Tablet. 650 milliGRAM(s) Oral every 6 hours PRN Moderate Pain (4 - 6)    RADIOLOGY & ADDITIONAL TESTS:    A/P: 72 year old male, PMHx HLD, Asthma, BPH, CAD s/p PCI x 5, Afib on eliquis, popliteal aneurysm s/p bypass found to have tracheobronchomalacia evaluated by Dr. Solo as an outpatient and presented to [facility] on 8/14 for elective surgery. Patient underwent uncomplicated R VATS tracheobronchoplasty. Post procedure transferred to CT ICU where he remained stable and transferred to  on POD#1.     Neurovascular: Stable. Pain well controlled with current regimen.  - Continue Tylenol PRN     Cardiovascular: Hemodynamically stable. HR controlled.  - hx of CAD and Afib, continue asa 81mg and simvastatin 20mg qhs. Holding Eliquis until tomorrow     Respiratory: 02 Sat = 97% on RA.  - Chest tube removed this AM, follow up CXR stable. Follow up CXR in AM   - Encourage C+DB and Use of IS 10x / hr while awake.  - Continue inhalers and nebulizers     GI: Stable.  - Continue PO diet   - Continue protonix 40mg for GI ppx   - Continue bowel regimen     Renal / : Cr 0.96, no active issues   - Monitor renal function.  - Monitor I/O's.    Endocrine: hgba1c 5.4, TSH 2.005   - no active issues     Prophylaxis:  -DVT prophylaxis with 5000 SubQ Heparin q8h.  -SCD's    Disposition: transfer to

## 2018-08-15 NOTE — CHART NOTE - NSCHARTNOTEFT_GEN_A_CORE
CT Removal:    Pt seen and examined at bedside.  Case discussed with Dr. Solo   Minimal output from CTs.  No air leak appreciated.  CT removed without incident per Dr. Solo's request.  Occlusive DSD placed.  CXR no obvious PTX noted.  Pt tolerated procedure well.

## 2018-08-16 ENCOUNTER — TRANSCRIPTION ENCOUNTER (OUTPATIENT)
Age: 73
End: 2018-08-16

## 2018-08-16 LAB
ANION GAP SERPL CALC-SCNC: 9 MMOL/L — SIGNIFICANT CHANGE UP (ref 5–17)
APTT BLD: 29.8 SEC — SIGNIFICANT CHANGE UP (ref 27.5–37.4)
BUN SERPL-MCNC: 12 MG/DL — SIGNIFICANT CHANGE UP (ref 7–23)
CALCIUM SERPL-MCNC: 8.7 MG/DL — SIGNIFICANT CHANGE UP (ref 8.4–10.5)
CHLORIDE SERPL-SCNC: 101 MMOL/L — SIGNIFICANT CHANGE UP (ref 96–108)
CO2 SERPL-SCNC: 27 MMOL/L — SIGNIFICANT CHANGE UP (ref 22–31)
CREAT SERPL-MCNC: 0.87 MG/DL — SIGNIFICANT CHANGE UP (ref 0.5–1.3)
GLUCOSE SERPL-MCNC: 113 MG/DL — HIGH (ref 70–99)
HCT VFR BLD CALC: 38.3 % — LOW (ref 39–50)
HGB BLD-MCNC: 12.7 G/DL — LOW (ref 13–17)
INR BLD: 1.1 — SIGNIFICANT CHANGE UP (ref 0.88–1.16)
MAGNESIUM SERPL-MCNC: 1.9 MG/DL — SIGNIFICANT CHANGE UP (ref 1.6–2.6)
MCHC RBC-ENTMCNC: 31.8 PG — SIGNIFICANT CHANGE UP (ref 27–34)
MCHC RBC-ENTMCNC: 33.2 G/DL — SIGNIFICANT CHANGE UP (ref 32–36)
MCV RBC AUTO: 96 FL — SIGNIFICANT CHANGE UP (ref 80–100)
PLATELET # BLD AUTO: 182 K/UL — SIGNIFICANT CHANGE UP (ref 150–400)
POTASSIUM SERPL-MCNC: 4.1 MMOL/L — SIGNIFICANT CHANGE UP (ref 3.5–5.3)
POTASSIUM SERPL-SCNC: 4.1 MMOL/L — SIGNIFICANT CHANGE UP (ref 3.5–5.3)
PROTHROM AB SERPL-ACNC: 12.2 SEC — SIGNIFICANT CHANGE UP (ref 9.8–12.7)
RBC # BLD: 3.99 M/UL — LOW (ref 4.2–5.8)
RBC # FLD: 13.7 % — SIGNIFICANT CHANGE UP (ref 10.3–16.9)
SODIUM SERPL-SCNC: 137 MMOL/L — SIGNIFICANT CHANGE UP (ref 135–145)
WBC # BLD: 7.2 K/UL — SIGNIFICANT CHANGE UP (ref 3.8–10.5)
WBC # FLD AUTO: 7.2 K/UL — SIGNIFICANT CHANGE UP (ref 3.8–10.5)

## 2018-08-16 PROCEDURE — 71045 X-RAY EXAM CHEST 1 VIEW: CPT | Mod: 26

## 2018-08-16 RX ORDER — LACTULOSE 10 G/15ML
20 SOLUTION ORAL ONCE
Qty: 0 | Refills: 0 | Status: COMPLETED | OUTPATIENT
Start: 2018-08-16 | End: 2018-08-16

## 2018-08-16 RX ORDER — OXYCODONE AND ACETAMINOPHEN 5; 325 MG/1; MG/1
2 TABLET ORAL EVERY 6 HOURS
Qty: 0 | Refills: 0 | Status: DISCONTINUED | OUTPATIENT
Start: 2018-08-16 | End: 2018-08-16

## 2018-08-16 RX ORDER — ACETAMINOPHEN 500 MG
1000 TABLET ORAL ONCE
Qty: 0 | Refills: 0 | Status: COMPLETED | OUTPATIENT
Start: 2018-08-16 | End: 2018-08-16

## 2018-08-16 RX ORDER — METOPROLOL TARTRATE 50 MG
12.5 TABLET ORAL EVERY 12 HOURS
Qty: 0 | Refills: 0 | Status: DISCONTINUED | OUTPATIENT
Start: 2018-08-16 | End: 2018-08-17

## 2018-08-16 RX ORDER — APIXABAN 2.5 MG/1
5 TABLET, FILM COATED ORAL EVERY 12 HOURS
Qty: 0 | Refills: 0 | Status: DISCONTINUED | OUTPATIENT
Start: 2018-08-16 | End: 2018-08-17

## 2018-08-16 RX ORDER — MAGNESIUM OXIDE 400 MG ORAL TABLET 241.3 MG
800 TABLET ORAL ONCE
Qty: 0 | Refills: 0 | Status: COMPLETED | OUTPATIENT
Start: 2018-08-16 | End: 2018-08-16

## 2018-08-16 RX ORDER — OXYCODONE AND ACETAMINOPHEN 5; 325 MG/1; MG/1
1 TABLET ORAL EVERY 6 HOURS
Qty: 0 | Refills: 0 | Status: DISCONTINUED | OUTPATIENT
Start: 2018-08-16 | End: 2018-08-16

## 2018-08-16 RX ADMIN — MAGNESIUM OXIDE 400 MG ORAL TABLET 800 MILLIGRAM(S): 241.3 TABLET ORAL at 17:41

## 2018-08-16 RX ADMIN — POLYETHYLENE GLYCOL 3350 17 GRAM(S): 17 POWDER, FOR SOLUTION ORAL at 12:19

## 2018-08-16 RX ADMIN — Medication 100 MILLIGRAM(S): at 21:55

## 2018-08-16 RX ADMIN — Medication 100 MILLIGRAM(S): at 14:57

## 2018-08-16 RX ADMIN — Medication 81 MILLIGRAM(S): at 12:19

## 2018-08-16 RX ADMIN — SENNA PLUS 2 TABLET(S): 8.6 TABLET ORAL at 21:55

## 2018-08-16 RX ADMIN — Medication 0.25 MILLIGRAM(S): at 05:54

## 2018-08-16 RX ADMIN — Medication 3 MILLILITER(S): at 23:55

## 2018-08-16 RX ADMIN — MAGNESIUM OXIDE 400 MG ORAL TABLET 800 MILLIGRAM(S): 241.3 TABLET ORAL at 09:00

## 2018-08-16 RX ADMIN — Medication 400 MILLIGRAM(S): at 07:15

## 2018-08-16 RX ADMIN — HEPARIN SODIUM 5000 UNIT(S): 5000 INJECTION INTRAVENOUS; SUBCUTANEOUS at 14:57

## 2018-08-16 RX ADMIN — Medication 12.5 MILLIGRAM(S): at 17:40

## 2018-08-16 RX ADMIN — PANTOPRAZOLE SODIUM 40 MILLIGRAM(S): 20 TABLET, DELAYED RELEASE ORAL at 06:21

## 2018-08-16 RX ADMIN — Medication 3 MILLILITER(S): at 12:19

## 2018-08-16 RX ADMIN — SODIUM CHLORIDE 3 MILLILITER(S): 9 INJECTION INTRAMUSCULAR; INTRAVENOUS; SUBCUTANEOUS at 05:49

## 2018-08-16 RX ADMIN — HEPARIN SODIUM 5000 UNIT(S): 5000 INJECTION INTRAVENOUS; SUBCUTANEOUS at 05:53

## 2018-08-16 RX ADMIN — LACTULOSE 20 GRAM(S): 10 SOLUTION ORAL at 17:41

## 2018-08-16 RX ADMIN — Medication 1000 MILLIGRAM(S): at 07:45

## 2018-08-16 RX ADMIN — SODIUM CHLORIDE 3 MILLILITER(S): 9 INJECTION INTRAMUSCULAR; INTRAVENOUS; SUBCUTANEOUS at 14:55

## 2018-08-16 RX ADMIN — SODIUM CHLORIDE 3 MILLILITER(S): 9 INJECTION INTRAMUSCULAR; INTRAVENOUS; SUBCUTANEOUS at 21:12

## 2018-08-16 RX ADMIN — Medication 12.5 MILLIGRAM(S): at 08:58

## 2018-08-16 RX ADMIN — Medication 3 MILLILITER(S): at 05:54

## 2018-08-16 RX ADMIN — Medication 0.25 MILLIGRAM(S): at 17:44

## 2018-08-16 RX ADMIN — Medication 3 MILLILITER(S): at 17:44

## 2018-08-16 RX ADMIN — APIXABAN 5 MILLIGRAM(S): 2.5 TABLET, FILM COATED ORAL at 17:41

## 2018-08-16 RX ADMIN — SIMVASTATIN 20 MILLIGRAM(S): 20 TABLET, FILM COATED ORAL at 21:55

## 2018-08-16 RX ADMIN — Medication 100 MILLIGRAM(S): at 05:53

## 2018-08-16 NOTE — DISCHARGE NOTE ADULT - MEDICATION SUMMARY - MEDICATIONS TO TAKE
I will START or STAY ON the medications listed below when I get home from the hospital:    aspirin 81 mg oral delayed release tablet  -- 1 tab(s) by mouth once a day  -- Indication: For Heart Disease    acetaminophen 325 mg oral tablet  -- 2 tab(s) by mouth every 6 hours, As needed, Mild Pain (1 - 3)  -- Indication: For As needed for mild pain    apixaban 5 mg oral tablet  -- 1 tab(s) by mouth every 12 hours  -- Indication: For Atrial fibrillation    simvastatin 20 mg oral tablet  -- 1 tab(s) by mouth once a day (at bedtime)  -- Indication: For Cholesterol    metoprolol tartrate 25 mg oral tablet  -- 0.5 tab(s) by mouth every 12 hours x 30 days   -- Indication: For Atrial fibrillation / Heart Rate     Ventolin HFA 90 mcg/inh inhalation aerosol  -- 2 puff(s) inhaled 4 times a day, As Needed  -- Indication: For As needed for SOB    Dulera 100 mcg-5 mcg/inh inhalation aerosol  -- 2 puff(s) inhaled 2 times a day  -- Indication: For As needed for shortness of breath    Tudorza Pressair 400 mcg/inh inhalation powder  -- 1 puff(s) inhaled 2 times a day  1 puff   -- Indication: For As needed for shortness of breath    docusate sodium 100 mg oral capsule  -- 1 cap(s) by mouth 3 times a day  -- Indication: For Stool softener, hold for loose stool    polyethylene glycol 3350 oral powder for reconstitution  -- 17 gram(s) by mouth once a day  -- Indication: For As needed for constipation    Singulair 10 mg oral tablet  -- 1 tab(s) by mouth once a day, As Needed  -- Indication: For Asthma    Carafate 1 g oral tablet  -- 1 tab(s) by mouth every 6 hours  -- Indication: For GERD    pantoprazole 40 mg oral delayed release tablet  -- 1 tab(s) by mouth once a day (before a meal)  -- Indication: For GERD

## 2018-08-16 NOTE — DISCHARGE NOTE ADULT - MEDICATION SUMMARY - MEDICATIONS TO STOP TAKING
I will STOP taking the medications listed below when I get home from the hospital:    dilTIAZem 30 mg oral tablet  -- 1 tab(s) by mouth 4 times a day, As Needed

## 2018-08-16 NOTE — DISCHARGE NOTE ADULT - PATIENT PORTAL LINK FT
You can access the [facility] Portal, offered by [facility], by registering with the following website: [email or web address]

## 2018-08-16 NOTE — DISCHARGE NOTE ADULT - PLAN OF CARE
Recover from surgery -Please follow up with  ___on ___.  The office is located at [facility], 4th floor. Call us with any questions #[phone].    -Walk daily as tolerated and use your incentive spirometer every hour.    -No driving or strenuous activity/exercise for 6 weeks, or until cleared by your surgeon.    -Gently clean your incisions with anti-bacterial soap and water, pat dry.  You may leave them open to air.    -Call your doctor if you have shortness of breath, chest pain not relieved by pain medication, dizziness, fever >101.5, or increased redness or drainage from incisions. - Please follow up with Dr. Ta on Friday at 11:00 AM.  The office is located at [facility], [facility], 4th floor. Call us with any questions #[phone].    - Please follow up with Dr. Jasiel Welsh within 1-2 weeks from discharge.     - Please keep chest tube bandage site clean, dry and intact until your appointment with Dr. Ta. You may shower.     -Walk daily as tolerated and use your incentive spirometer every hour.    -No driving or strenuous activity/exercise for 6 weeks, or until cleared by your surgeon.    -Gently clean your incisions with anti-bacterial soap and water, pat dry.  You may leave them open to air.    -Call your doctor if you have shortness of breath, chest pain not relieved by pain medication, dizziness, fever >101.5, or increased redness or drainage from incisions.

## 2018-08-16 NOTE — PROGRESS NOTE ADULT - ASSESSMENT
72 year old male with pmh of HLD, Asthma, BPH, CAD s/p PCI x 5, Afib on eliquis, popliteal aneurysm s/p bypass found to have tracheobronchomalacia evaluated by Dr. Solo as an outpatient and presented to [facility] on 8/14 for elective surgery. Patient underwent uncomplicated R VATS tracheobronchoplasty. Post procedure transferred to CT ICU where he remained stable and transferred to  on POD#2 recovering well. 72 year old male with pmh of HLD, Asthma, BPH, CAD s/p PCI x 5, Afib on eliquis, popliteal aneurysm s/p bypass found to have tracheobronchomalacia evaluated by Dr. Solo as an outpatient and presented to [facility] on 8/14 for elective surgery. Patient underwent uncomplicated R VATS tracheobronchoplasty. Post procedure transferred to CT ICU where he remained stable and transferred to  on POD#2 recovering well.    Plan  Neurovascular: No delirium. continue tylenol, Adding percocet for better pain control    Cardiovascular: Hemodynamically stable. HR controlled.  -continue aspirin, plavix, atorvastatin. restart Eliquis 5mg bid    -start lopressor 12.5mg bid    Respiratory: 02 Sat = 98% on RA.  -Encourage C+DB and Use of IS 10x / hr while awake.  - daily CXR.    GI: Stable.  -PPX.  -PO Diet.  -bowel regimen (senna/colace)    Renal / :  -Monitor renal function.  -Monitor I/O's.    Endocrine:    -Blood glucose controlled    Hematologic:  -H/H stable    ID:  -afebrile  -CBC.    Prophylaxis:  -DVT prophylaxis on Eliquis  -SCD's    Disposition:  -Home Friday

## 2018-08-16 NOTE — DISCHARGE NOTE ADULT - HOSPITAL COURSE
This is a 72 year old male with a history of HLD, asthma, BPH, CAD s/p coronary stents x 5, afib on Eliquis (last dose 8/8/18), popliteal aneurysm s/p bypass, who initially presented with chronic cough.  He was found to have tracheobronchomalacia and was worked up as an out-patient.        On 8/14/18 patient underwent  Bronchoscopy, Right VATs, Robotic Assisted, Tracheobronchoplasty with Prolene Mesh. Patient had an uncomplicated intraop course.  He arrived to CTICU extubated with 1 CT in place, no air leak.  Subcutaneous  air noted on post-op CXR.  He remained stable overnight. and on  POD 1 the  CT removed with a stable follow up  CXR. Patient was hemodynamically stable and was  transferred to floor later in the day.  Pain management was an issue now adequately managed and he is afebrile ready for discharge home This is a 72 year old male with a history of HLD, asthma, BPH, CAD s/p coronary stents x 5, afib on Eliquis (last dose 8/8/18), popliteal aneurysm s/p bypass, who initially presented with chronic cough.  He was found to have tracheobronchomalacia and was worked up as an out-patient.        On 8/14/18 patient underwent  Bronchoscopy, Right VATs, Robotic Assisted, Tracheobronchoplasty with Prolene Mesh. Patient had an uncomplicated intraop course.  He arrived to CTICU extubated with 1 CT in place, no air leak.  Subcutaneous  air noted on post-op CXR.  He remained stable overnight. and on  POD 1 the  CT removed with a stable follow up  CXR. Patient was hemodynamically stable and was  transferred to floor later in the day.  POD 2 started on Beta Blocker for sinus tachycardia. Pain management was an issue now adequately managed and he is afebrile ready for discharge home This is a 72 year old male with a history of HLD, asthma, BPH, CAD s/p coronary stents x 5, afib on Eliquis (last dose 8/8/18), popliteal aneurysm s/p bypass, who initially presented with chronic cough.  He was found to have tracheobronchomalacia and was worked up as an out-patient.        On 8/14/18 patient underwent  Bronchoscopy, Right VATs, Robotic Assisted, Tracheobronchoplasty with Prolene Mesh. Patient had an uncomplicated intraop course.  He arrived to CTICU extubated with 1 CT in place, no air leak.  Subcutaneous  air noted on post-op CXR.  He remained stable overnight. and on  POD 1 the  CT removed with a stable follow up  CXR. Patient was hemodynamically stable and was  transferred to floor later in the day.  POD 2 started on Beta Blocker for sinus tachycardia. He continued to remain stable, and as per Dr. Solo is ready for discharge home on POD 3.

## 2018-08-16 NOTE — DISCHARGE NOTE ADULT - CARE PROVIDERS DIRECT ADDRESSES
[email or web address]

## 2018-08-16 NOTE — DISCHARGE NOTE ADULT - CARE PROVIDER_API CALL
Oskar Solo (MD), Surgery; Thoracic Surgery  [address]  [address] Floor  [place], [place] [address]  Phone: [phone]  Fax: [phone] Oskar Solo), Surgery; Thoracic Surgery  [address]  [address] [place] [address]  Phone: [phone]  Fax: [phone]    Jasiel Welsh), Internal Medicine; Pulmonary Disease  [address] [address]  Phone: [phone]  Fax: [phone]

## 2018-08-16 NOTE — DISCHARGE NOTE ADULT - CARE PLAN
Principal Discharge DX:	Tracheobronchomalacia  Goal:	Recover from surgery  Assessment and plan of treatment:	-Please follow up with  ___on ___.  The office is located at [facility], [facility], 4th floor. Call us with any questions #[phone].    -Walk daily as tolerated and use your incentive spirometer every hour.    -No driving or strenuous activity/exercise for 6 weeks, or until cleared by your surgeon.    -Gently clean your incisions with anti-bacterial soap and water, pat dry.  You may leave them open to air.    -Call your doctor if you have shortness of breath, chest pain not relieved by pain medication, dizziness, fever >101.5, or increased redness or drainage from incisions. Principal Discharge DX:	Tracheobronchomalacia  Goal:	Recover from surgery  Assessment and plan of treatment:	- Please follow up with Dr. Ta on Friday at 11:00 AM.  The office is located at [facility], [facility], 4th floor. Call us with any questions #[phone].    - Please follow up with Dr. Jasiel Welsh within 1-2 weeks from discharge.     - Please keep chest tube bandage site clean, dry and intact until your appointment with Dr. Ta. You may shower.     -Walk daily as tolerated and use your incentive spirometer every hour.    -No driving or strenuous activity/exercise for 6 weeks, or until cleared by your surgeon.    -Gently clean your incisions with anti-bacterial soap and water, pat dry.  You may leave them open to air.    -Call your doctor if you have shortness of breath, chest pain not relieved by pain medication, dizziness, fever >101.5, or increased redness or drainage from incisions.

## 2018-08-17 VITALS
DIASTOLIC BLOOD PRESSURE: 74 MMHG | SYSTOLIC BLOOD PRESSURE: 109 MMHG | OXYGEN SATURATION: 96 % | RESPIRATION RATE: 16 BRPM | HEART RATE: 78 BPM

## 2018-08-17 PROBLEM — J39.8 OTHER SPECIFIED DISEASES OF UPPER RESPIRATORY TRACT: Chronic | Status: ACTIVE | Noted: 2018-08-14

## 2018-08-17 LAB
ANION GAP SERPL CALC-SCNC: 12 MMOL/L — SIGNIFICANT CHANGE UP (ref 5–17)
BUN SERPL-MCNC: 10 MG/DL — SIGNIFICANT CHANGE UP (ref 7–23)
CALCIUM SERPL-MCNC: 8.9 MG/DL — SIGNIFICANT CHANGE UP (ref 8.4–10.5)
CHLORIDE SERPL-SCNC: 103 MMOL/L — SIGNIFICANT CHANGE UP (ref 96–108)
CO2 SERPL-SCNC: 26 MMOL/L — SIGNIFICANT CHANGE UP (ref 22–31)
CREAT SERPL-MCNC: 0.74 MG/DL — SIGNIFICANT CHANGE UP (ref 0.5–1.3)
GLUCOSE SERPL-MCNC: 101 MG/DL — HIGH (ref 70–99)
HCT VFR BLD CALC: 38.7 % — LOW (ref 39–50)
HGB BLD-MCNC: 12.3 G/DL — LOW (ref 13–17)
MAGNESIUM SERPL-MCNC: 2.1 MG/DL — SIGNIFICANT CHANGE UP (ref 1.6–2.6)
MCHC RBC-ENTMCNC: 31.1 PG — SIGNIFICANT CHANGE UP (ref 27–34)
MCHC RBC-ENTMCNC: 31.8 G/DL — LOW (ref 32–36)
MCV RBC AUTO: 97.7 FL — SIGNIFICANT CHANGE UP (ref 80–100)
PHOSPHATE SERPL-MCNC: 2.7 MG/DL — SIGNIFICANT CHANGE UP (ref 2.5–4.5)
PLATELET # BLD AUTO: 197 K/UL — SIGNIFICANT CHANGE UP (ref 150–400)
POTASSIUM SERPL-MCNC: 3.8 MMOL/L — SIGNIFICANT CHANGE UP (ref 3.5–5.3)
POTASSIUM SERPL-SCNC: 3.8 MMOL/L — SIGNIFICANT CHANGE UP (ref 3.5–5.3)
RBC # BLD: 3.96 M/UL — LOW (ref 4.2–5.8)
RBC # FLD: 13.7 % — SIGNIFICANT CHANGE UP (ref 10.3–16.9)
SODIUM SERPL-SCNC: 141 MMOL/L — SIGNIFICANT CHANGE UP (ref 135–145)
WBC # BLD: 5.6 K/UL — SIGNIFICANT CHANGE UP (ref 3.8–10.5)
WBC # FLD AUTO: 5.6 K/UL — SIGNIFICANT CHANGE UP (ref 3.8–10.5)

## 2018-08-17 PROCEDURE — 80053 COMPREHEN METABOLIC PANEL: CPT

## 2018-08-17 PROCEDURE — 94640 AIRWAY INHALATION TREATMENT: CPT

## 2018-08-17 PROCEDURE — C1781: CPT

## 2018-08-17 PROCEDURE — C1889: CPT

## 2018-08-17 PROCEDURE — 84100 ASSAY OF PHOSPHORUS: CPT

## 2018-08-17 PROCEDURE — 82330 ASSAY OF CALCIUM: CPT

## 2018-08-17 PROCEDURE — 85027 COMPLETE CBC AUTOMATED: CPT

## 2018-08-17 PROCEDURE — 71045 X-RAY EXAM CHEST 1 VIEW: CPT | Mod: 26

## 2018-08-17 PROCEDURE — 85730 THROMBOPLASTIN TIME PARTIAL: CPT

## 2018-08-17 PROCEDURE — 82803 BLOOD GASES ANY COMBINATION: CPT

## 2018-08-17 PROCEDURE — 83735 ASSAY OF MAGNESIUM: CPT

## 2018-08-17 PROCEDURE — 36415 COLL VENOUS BLD VENIPUNCTURE: CPT

## 2018-08-17 PROCEDURE — S2900: CPT

## 2018-08-17 PROCEDURE — 86850 RBC ANTIBODY SCREEN: CPT

## 2018-08-17 PROCEDURE — 84132 ASSAY OF SERUM POTASSIUM: CPT

## 2018-08-17 PROCEDURE — 85610 PROTHROMBIN TIME: CPT

## 2018-08-17 PROCEDURE — 71045 X-RAY EXAM CHEST 1 VIEW: CPT

## 2018-08-17 PROCEDURE — 86901 BLOOD TYPING SEROLOGIC RH(D): CPT

## 2018-08-17 PROCEDURE — 86900 BLOOD TYPING SEROLOGIC ABO: CPT

## 2018-08-17 PROCEDURE — 84295 ASSAY OF SERUM SODIUM: CPT

## 2018-08-17 PROCEDURE — 88305 TISSUE EXAM BY PATHOLOGIST: CPT

## 2018-08-17 PROCEDURE — 80048 BASIC METABOLIC PNL TOTAL CA: CPT

## 2018-08-17 RX ORDER — POTASSIUM CHLORIDE 20 MEQ
40 PACKET (EA) ORAL ONCE
Qty: 0 | Refills: 0 | Status: COMPLETED | OUTPATIENT
Start: 2018-08-17 | End: 2018-08-17

## 2018-08-17 RX ORDER — ALBUTEROL 90 UG/1
2 AEROSOL, METERED ORAL
Qty: 1 | Refills: 0 | OUTPATIENT
Start: 2018-08-17 | End: 2018-09-15

## 2018-08-17 RX ORDER — DOCUSATE SODIUM 100 MG
1 CAPSULE ORAL
Qty: 21 | Refills: 0 | OUTPATIENT
Start: 2018-08-17 | End: 2018-08-23

## 2018-08-17 RX ORDER — ACLIDINIUM BROMIDE 400 UG/1
0 POWDER, METERED RESPIRATORY (INHALATION)
Qty: 0 | Refills: 0 | COMMUNITY

## 2018-08-17 RX ORDER — ASPIRIN/CALCIUM CARB/MAGNESIUM 324 MG
1 TABLET ORAL
Qty: 30 | Refills: 0 | OUTPATIENT
Start: 2018-08-17 | End: 2018-09-15

## 2018-08-17 RX ORDER — SIMVASTATIN 20 MG/1
1 TABLET, FILM COATED ORAL
Qty: 30 | Refills: 0 | OUTPATIENT
Start: 2018-08-17 | End: 2018-09-15

## 2018-08-17 RX ORDER — POLYETHYLENE GLYCOL 3350 17 G/17G
17 POWDER, FOR SOLUTION ORAL
Qty: 119 | Refills: 0 | OUTPATIENT
Start: 2018-08-17 | End: 2018-08-23

## 2018-08-17 RX ORDER — MOMETASONE FUROATE AND FORMOTEROL FUMARATE DIHYDRATE 200; 5 UG/1; UG/1
2 AEROSOL RESPIRATORY (INHALATION)
Qty: 1 | Refills: 0 | OUTPATIENT
Start: 2018-08-17 | End: 2018-09-15

## 2018-08-17 RX ORDER — ALBUTEROL 90 UG/1
2 AEROSOL, METERED ORAL
Qty: 0 | Refills: 0 | COMMUNITY

## 2018-08-17 RX ORDER — MONTELUKAST 4 MG/1
1 TABLET, CHEWABLE ORAL
Qty: 0 | Refills: 0 | COMMUNITY

## 2018-08-17 RX ORDER — ACLIDINIUM BROMIDE 400 UG/1
1 POWDER, METERED RESPIRATORY (INHALATION)
Qty: 1 | Refills: 0 | OUTPATIENT
Start: 2018-08-17 | End: 2018-09-15

## 2018-08-17 RX ORDER — APIXABAN 2.5 MG/1
1 TABLET, FILM COATED ORAL
Qty: 60 | Refills: 0 | OUTPATIENT
Start: 2018-08-17 | End: 2018-09-15

## 2018-08-17 RX ORDER — ACETAMINOPHEN 500 MG
2 TABLET ORAL
Qty: 240 | Refills: 0 | OUTPATIENT
Start: 2018-08-17 | End: 2018-09-15

## 2018-08-17 RX ORDER — SIMVASTATIN 20 MG/1
1 TABLET, FILM COATED ORAL
Qty: 0 | Refills: 0 | COMMUNITY

## 2018-08-17 RX ORDER — APIXABAN 2.5 MG/1
1 TABLET, FILM COATED ORAL
Qty: 0 | Refills: 0 | COMMUNITY

## 2018-08-17 RX ORDER — METOPROLOL TARTRATE 50 MG
0.5 TABLET ORAL
Qty: 30 | Refills: 0 | OUTPATIENT
Start: 2018-08-17 | End: 2018-09-15

## 2018-08-17 RX ORDER — MONTELUKAST 4 MG/1
1 TABLET, CHEWABLE ORAL
Qty: 30 | Refills: 0 | OUTPATIENT
Start: 2018-08-17 | End: 2018-09-15

## 2018-08-17 RX ORDER — MOMETASONE FUROATE AND FORMOTEROL FUMARATE DIHYDRATE 200; 5 UG/1; UG/1
2 AEROSOL RESPIRATORY (INHALATION)
Qty: 0 | Refills: 0 | COMMUNITY

## 2018-08-17 RX ORDER — PANTOPRAZOLE SODIUM 20 MG/1
1 TABLET, DELAYED RELEASE ORAL
Qty: 30 | Refills: 0 | OUTPATIENT
Start: 2018-08-17 | End: 2018-09-15

## 2018-08-17 RX ADMIN — Medication 3 MILLILITER(S): at 06:28

## 2018-08-17 RX ADMIN — APIXABAN 5 MILLIGRAM(S): 2.5 TABLET, FILM COATED ORAL at 06:24

## 2018-08-17 RX ADMIN — Medication 0.25 MILLIGRAM(S): at 06:29

## 2018-08-17 RX ADMIN — SODIUM CHLORIDE 3 MILLILITER(S): 9 INJECTION INTRAMUSCULAR; INTRAVENOUS; SUBCUTANEOUS at 06:20

## 2018-08-17 RX ADMIN — Medication 40 MILLIEQUIVALENT(S): at 09:07

## 2018-08-17 RX ADMIN — PANTOPRAZOLE SODIUM 40 MILLIGRAM(S): 20 TABLET, DELAYED RELEASE ORAL at 06:24

## 2018-08-17 RX ADMIN — Medication 100 MILLIGRAM(S): at 06:24

## 2018-08-17 RX ADMIN — Medication 81 MILLIGRAM(S): at 10:10

## 2018-08-17 RX ADMIN — Medication 12.5 MILLIGRAM(S): at 06:24

## 2018-08-17 NOTE — PROGRESS NOTE ADULT - SUBJECTIVE AND OBJECTIVE BOX
Patient discussed on morning rounds with Dr. Solo       Operation / Date: 8/14/18 R VATS tracheobronchoplasty     Surgeon: Dr. Solo     Referring Physician: Dr. Jasiel Welsh     SUBJECTIVE ASSESSMENT:  Patient seen this morning at bedside, doing well and not offering any complaints at this time. Denies any chest pain, shortness of breath or palpitations. Patient ambulating multiple times without difficulty and has is able to get to 3000 on incentive spirometer.     Hospital Course:  This is a 72 year old male with a history of HLD, asthma, BPH, CAD s/p coronary stents x 5, afib on Eliquis (last dose 8/8/18), popliteal aneurysm s/p bypass, who initially presented with chronic cough.  He was found to have tracheobronchomalacia and was worked up as an out-patient.        On 8/14/18 patient underwent  Bronchoscopy, Right VATs, Robotic Assisted, Tracheobronchoplasty with Prolene Mesh. Patient had an uncomplicated intraop course.  He arrived to CTICU extubated with 1 CT in place, no air leak.  Subcutaneous  air noted on post-op CXR.  He remained stable overnight. and on  POD 1 the  CT removed with a stable follow up  CXR. Patient was hemodynamically stable and was  transferred to floor later in the day.  POD 2 started on Beta Blocker for sinus tachycardia. He continued to remain stable, and as per Dr. Solo is ready for discharge home on POD 3.     Vital Signs Last 24 Hrs  T(C): 36.8 (17 Aug 2018 05:00), Max: 37.1 (16 Aug 2018 16:43)  T(F): 98.3 (17 Aug 2018 05:00), Max: 98.8 (16 Aug 2018 16:43)  HR: 84 (17 Aug 2018 06:24) (74 - 94)  BP: 119/69 (17 Aug 2018 06:24) (101/56 - 146/79)  BP(mean): 87 (17 Aug 2018 06:24) (66 - 114)  RR: 17 (17 Aug 2018 06:24) (16 - 18)  SpO2: 97% (17 Aug 2018 06:24) (94% - 97%)  I&O's Detail    16 Aug 2018 07:01  -  17 Aug 2018 07:00  --------------------------------------------------------  IN:    Oral Fluid: 580 mL  Total IN: 580 mL    OUT:    Voided: 650 mL  Total OUT: 650 mL    Total NET: -70 mL          EPICARDIAL WIRES REMOVED: n/a   TIE DOWNS REMOVED: No to be removed as an outpatient     PHYSICAL EXAM:    General: Patient sitting comfortably in a chair, no acute distress     Neurological: Alert and oriented. No focal neurological deficits     Cardiovascular: S1S2, RRR, no murmurs appreciated on exam     Respiratory: Clear to ausculation bilaterally     Gastrointestinal: Abdomen soft, non tender, non distended     Extremities: Warm and well perfused. 1+ pitting edema bilaterally. No calf tenderness     Vascular: Peripheral pulses 2+ bilaterally     Incision Sites: R VATS incisions C/D/I, no drainage or surrounding erythema   Chest tube site covered with occlusive dressing     LABS:                        12.3   5.6   )-----------( 197      ( 17 Aug 2018 06:47 )             38.7       COUMADIN:  No    PT/INR - ( 16 Aug 2018 05:31 )   PT: 12.2 sec;   INR: 1.10          PTT - ( 16 Aug 2018 05:31 )  PTT:29.8 sec    08-17    141  |  103  |  10  ----------------------------<  101<H>  3.8   |  26  |  0.74    Ca    8.9      17 Aug 2018 06:46  Phos  2.7     08-17  Mg     2.1     08-17            MEDICATIONS  (STANDING): See Med Rec      Discharge CXR: 8/17 pending official read, improvement in right chest wall subcutaneous emphysema. No obvious ptx      - Please follow up with Dr. Ta on Friday at 11:00 AM.  The office is located at [facility], 4th floor. Call us with any questions #[phone].    - Please follow up with Dr. Jasiel Welsh within 1-2 weeks from discharge.     - Please keep chest tube bandage site clean, dry and intact until your appointment with Dr. Ta. You may shower.

## 2018-08-20 LAB — SURGICAL PATHOLOGY STUDY: SIGNIFICANT CHANGE UP

## 2018-08-21 ENCOUNTER — APPOINTMENT (OUTPATIENT)
Dept: PULMONOLOGY | Facility: CLINIC | Age: 73
End: 2018-08-21
Payer: MEDICARE

## 2018-08-21 VITALS
SYSTOLIC BLOOD PRESSURE: 124 MMHG | BODY MASS INDEX: 24.71 KG/M2 | DIASTOLIC BLOOD PRESSURE: 70 MMHG | RESPIRATION RATE: 16 BRPM | HEIGHT: 68 IN | HEART RATE: 80 BPM | OXYGEN SATURATION: 99 % | WEIGHT: 163 LBS

## 2018-08-21 PROCEDURE — 71046 X-RAY EXAM CHEST 2 VIEWS: CPT

## 2018-08-21 PROCEDURE — 99214 OFFICE O/P EST MOD 30 MIN: CPT | Mod: 25

## 2018-08-23 DIAGNOSIS — I25.10 ATHEROSCLEROTIC HEART DISEASE OF NATIVE CORONARY ARTERY WITHOUT ANGINA PECTORIS: ICD-10-CM

## 2018-08-23 DIAGNOSIS — J39.8 OTHER SPECIFIED DISEASES OF UPPER RESPIRATORY TRACT: ICD-10-CM

## 2018-08-23 DIAGNOSIS — N40.0 BENIGN PROSTATIC HYPERPLASIA WITHOUT LOWER URINARY TRACT SYMPTOMS: ICD-10-CM

## 2018-08-23 DIAGNOSIS — Z95.5 PRESENCE OF CORONARY ANGIOPLASTY IMPLANT AND GRAFT: ICD-10-CM

## 2018-08-23 DIAGNOSIS — J45.909 UNSPECIFIED ASTHMA, UNCOMPLICATED: ICD-10-CM

## 2018-08-23 DIAGNOSIS — I48.91 UNSPECIFIED ATRIAL FIBRILLATION: ICD-10-CM

## 2018-08-23 DIAGNOSIS — E78.5 HYPERLIPIDEMIA, UNSPECIFIED: ICD-10-CM

## 2018-08-24 ENCOUNTER — APPOINTMENT (OUTPATIENT)
Dept: THORACIC SURGERY | Facility: CLINIC | Age: 73
End: 2018-08-24
Payer: MEDICARE

## 2018-08-24 VITALS
HEART RATE: 70 BPM | SYSTOLIC BLOOD PRESSURE: 126 MMHG | DIASTOLIC BLOOD PRESSURE: 63 MMHG | TEMPERATURE: 97.5 F | RESPIRATION RATE: 16 BRPM | OXYGEN SATURATION: 94 %

## 2018-08-24 DIAGNOSIS — R76.8 OTHER SPECIFIED ABNORMAL IMMUNOLOGICAL FINDINGS IN SERUM: ICD-10-CM

## 2018-08-24 PROCEDURE — 99024 POSTOP FOLLOW-UP VISIT: CPT

## 2018-09-06 ENCOUNTER — OUTPATIENT (OUTPATIENT)
Dept: OUTPATIENT SERVICES | Facility: HOSPITAL | Age: 73
LOS: 1 days | End: 2018-09-06
Payer: MEDICARE

## 2018-09-06 ENCOUNTER — APPOINTMENT (OUTPATIENT)
Dept: THORACIC SURGERY | Facility: CLINIC | Age: 73
End: 2018-09-06
Payer: MEDICARE

## 2018-09-06 VITALS
BODY MASS INDEX: 24.33 KG/M2 | DIASTOLIC BLOOD PRESSURE: 67 MMHG | SYSTOLIC BLOOD PRESSURE: 132 MMHG | OXYGEN SATURATION: 97 % | TEMPERATURE: 97.2 F | RESPIRATION RATE: 18 BRPM | WEIGHT: 160 LBS | HEART RATE: 78 BPM

## 2018-09-06 DIAGNOSIS — Z98.890 OTHER SPECIFIED POSTPROCEDURAL STATES: Chronic | ICD-10-CM

## 2018-09-06 DIAGNOSIS — Z86.79 PERSONAL HISTORY OF OTHER DISEASES OF THE CIRCULATORY SYSTEM: Chronic | ICD-10-CM

## 2018-09-06 DIAGNOSIS — J34.2 DEVIATED NASAL SEPTUM: Chronic | ICD-10-CM

## 2018-09-06 DIAGNOSIS — Z90.49 ACQUIRED ABSENCE OF OTHER SPECIFIED PARTS OF DIGESTIVE TRACT: Chronic | ICD-10-CM

## 2018-09-06 PROCEDURE — 99024 POSTOP FOLLOW-UP VISIT: CPT

## 2018-09-06 PROCEDURE — 71046 X-RAY EXAM CHEST 2 VIEWS: CPT

## 2018-09-06 PROCEDURE — 71046 X-RAY EXAM CHEST 2 VIEWS: CPT | Mod: 26

## 2018-09-13 ENCOUNTER — APPOINTMENT (OUTPATIENT)
Dept: CARDIOLOGY | Facility: CLINIC | Age: 73
End: 2018-09-13
Payer: MEDICARE

## 2018-09-13 VITALS
HEIGHT: 68 IN | WEIGHT: 160 LBS | DIASTOLIC BLOOD PRESSURE: 70 MMHG | HEART RATE: 72 BPM | BODY MASS INDEX: 24.25 KG/M2 | SYSTOLIC BLOOD PRESSURE: 126 MMHG

## 2018-09-13 PROCEDURE — 99215 OFFICE O/P EST HI 40 MIN: CPT

## 2018-10-01 ENCOUNTER — RX RENEWAL (OUTPATIENT)
Age: 73
End: 2018-10-01

## 2018-10-03 ENCOUNTER — FORM ENCOUNTER (OUTPATIENT)
Age: 73
End: 2018-10-03

## 2018-10-04 ENCOUNTER — OUTPATIENT (OUTPATIENT)
Dept: OUTPATIENT SERVICES | Facility: HOSPITAL | Age: 73
LOS: 1 days | End: 2018-10-04
Payer: MEDICARE

## 2018-10-04 ENCOUNTER — APPOINTMENT (OUTPATIENT)
Dept: THORACIC SURGERY | Facility: CLINIC | Age: 73
End: 2018-10-04
Payer: MEDICARE

## 2018-10-04 VITALS
DIASTOLIC BLOOD PRESSURE: 69 MMHG | SYSTOLIC BLOOD PRESSURE: 136 MMHG | BODY MASS INDEX: 24.48 KG/M2 | RESPIRATION RATE: 19 BRPM | WEIGHT: 161 LBS | OXYGEN SATURATION: 97 % | TEMPERATURE: 98.1 F | HEART RATE: 79 BPM

## 2018-10-04 DIAGNOSIS — Z90.49 ACQUIRED ABSENCE OF OTHER SPECIFIED PARTS OF DIGESTIVE TRACT: Chronic | ICD-10-CM

## 2018-10-04 DIAGNOSIS — Z09 ENCOUNTER FOR FOLLOW-UP EXAMINATION AFTER COMPLETED TREATMENT FOR CONDITIONS OTHER THAN MALIGNANT NEOPLASM: ICD-10-CM

## 2018-10-04 DIAGNOSIS — Z98.890 OTHER SPECIFIED POSTPROCEDURAL STATES: Chronic | ICD-10-CM

## 2018-10-04 DIAGNOSIS — Z86.79 PERSONAL HISTORY OF OTHER DISEASES OF THE CIRCULATORY SYSTEM: Chronic | ICD-10-CM

## 2018-10-04 DIAGNOSIS — J34.2 DEVIATED NASAL SEPTUM: Chronic | ICD-10-CM

## 2018-10-04 PROCEDURE — 99024 POSTOP FOLLOW-UP VISIT: CPT

## 2018-10-04 PROCEDURE — 71046 X-RAY EXAM CHEST 2 VIEWS: CPT

## 2018-10-04 PROCEDURE — 71046 X-RAY EXAM CHEST 2 VIEWS: CPT | Mod: 26

## 2018-10-05 PROBLEM — Z09 POSTOP CHECK: Status: ACTIVE | Noted: 2018-08-24

## 2018-10-12 ENCOUNTER — TRANSCRIPTION ENCOUNTER (OUTPATIENT)
Age: 73
End: 2018-10-12

## 2018-10-25 ENCOUNTER — RX RENEWAL (OUTPATIENT)
Age: 73
End: 2018-10-25

## 2018-10-27 ENCOUNTER — RX RENEWAL (OUTPATIENT)
Age: 73
End: 2018-10-27

## 2018-11-30 ENCOUNTER — RX RENEWAL (OUTPATIENT)
Age: 73
End: 2018-11-30

## 2019-01-02 ENCOUNTER — APPOINTMENT (OUTPATIENT)
Dept: PULMONOLOGY | Facility: CLINIC | Age: 74
End: 2019-01-02

## 2019-01-03 ENCOUNTER — APPOINTMENT (OUTPATIENT)
Dept: THORACIC SURGERY | Facility: CLINIC | Age: 74
End: 2019-01-03
Payer: MEDICARE

## 2019-01-03 ENCOUNTER — OUTPATIENT (OUTPATIENT)
Dept: OUTPATIENT SERVICES | Facility: HOSPITAL | Age: 74
LOS: 1 days | End: 2019-01-03
Payer: MEDICARE

## 2019-01-03 VITALS
DIASTOLIC BLOOD PRESSURE: 60 MMHG | WEIGHT: 166 LBS | RESPIRATION RATE: 19 BRPM | OXYGEN SATURATION: 97 % | SYSTOLIC BLOOD PRESSURE: 140 MMHG | TEMPERATURE: 97.6 F | BODY MASS INDEX: 25.24 KG/M2 | HEART RATE: 82 BPM

## 2019-01-03 DIAGNOSIS — Z90.49 ACQUIRED ABSENCE OF OTHER SPECIFIED PARTS OF DIGESTIVE TRACT: Chronic | ICD-10-CM

## 2019-01-03 DIAGNOSIS — Z98.890 OTHER SPECIFIED POSTPROCEDURAL STATES: Chronic | ICD-10-CM

## 2019-01-03 DIAGNOSIS — J34.2 DEVIATED NASAL SEPTUM: Chronic | ICD-10-CM

## 2019-01-03 DIAGNOSIS — Z09 ENCOUNTER FOR FOLLOW-UP EXAMINATION AFTER COMPLETED TREATMENT FOR CONDITIONS OTHER THAN MALIGNANT NEOPLASM: ICD-10-CM

## 2019-01-03 DIAGNOSIS — Z86.79 PERSONAL HISTORY OF OTHER DISEASES OF THE CIRCULATORY SYSTEM: Chronic | ICD-10-CM

## 2019-01-03 DIAGNOSIS — J39.8 OTHER SPECIFIED DISEASES OF UPPER RESPIRATORY TRACT: ICD-10-CM

## 2019-01-03 PROCEDURE — 94726 PLETHYSMOGRAPHY LUNG VOLUMES: CPT | Mod: 26

## 2019-01-03 PROCEDURE — 94060 EVALUATION OF WHEEZING: CPT

## 2019-01-03 PROCEDURE — 99213 OFFICE O/P EST LOW 20 MIN: CPT

## 2019-01-03 PROCEDURE — 94760 N-INVAS EAR/PLS OXIMETRY 1: CPT

## 2019-01-03 PROCEDURE — 94729 DIFFUSING CAPACITY: CPT

## 2019-01-03 PROCEDURE — 94010 BREATHING CAPACITY TEST: CPT | Mod: 26

## 2019-01-03 PROCEDURE — 94729 DIFFUSING CAPACITY: CPT | Mod: 26

## 2019-01-03 PROCEDURE — 94726 PLETHYSMOGRAPHY LUNG VOLUMES: CPT

## 2019-03-09 ENCOUNTER — TRANSCRIPTION ENCOUNTER (OUTPATIENT)
Age: 74
End: 2019-03-09

## 2019-03-14 ENCOUNTER — NON-APPOINTMENT (OUTPATIENT)
Age: 74
End: 2019-03-14

## 2019-03-14 ENCOUNTER — APPOINTMENT (OUTPATIENT)
Dept: CARDIOLOGY | Facility: CLINIC | Age: 74
End: 2019-03-14
Payer: MEDICARE

## 2019-03-14 VITALS
HEART RATE: 78 BPM | WEIGHT: 165 LBS | BODY MASS INDEX: 25.01 KG/M2 | OXYGEN SATURATION: 99 % | SYSTOLIC BLOOD PRESSURE: 122 MMHG | HEIGHT: 68 IN | DIASTOLIC BLOOD PRESSURE: 70 MMHG

## 2019-03-14 PROCEDURE — 93000 ELECTROCARDIOGRAM COMPLETE: CPT

## 2019-03-14 PROCEDURE — 99215 OFFICE O/P EST HI 40 MIN: CPT

## 2019-03-14 RX ORDER — ALBUTEROL SULFATE 2.5 MG/3ML
(2.5 MG/3ML) SOLUTION RESPIRATORY (INHALATION)
Qty: 120 | Refills: 5 | Status: DISCONTINUED | COMMUNITY
Start: 2018-04-30 | End: 2019-03-14

## 2019-03-14 RX ORDER — INHALER, ASSIST DEVICES
SPACER (EA) MISCELLANEOUS
Qty: 1 | Refills: 2 | Status: DISCONTINUED | OUTPATIENT
Start: 2018-03-20 | End: 2019-03-14

## 2019-03-14 RX ORDER — DESLORATADINE 5 MG/1
5 TABLET ORAL
Qty: 90 | Refills: 1 | Status: DISCONTINUED | COMMUNITY
Start: 2018-10-25 | End: 2019-03-14

## 2019-03-14 RX ORDER — ACLIDINIUM BROMIDE 400 UG/1
400 POWDER, METERED RESPIRATORY (INHALATION) TWICE DAILY
Qty: 3 | Refills: 2 | Status: DISCONTINUED | OUTPATIENT
Start: 2018-03-20 | End: 2019-03-14

## 2019-03-14 RX ORDER — DESLORATADINE 5 MG/1
5 TABLET, FILM COATED ORAL
Qty: 1 | Refills: 1 | Status: DISCONTINUED | COMMUNITY
Start: 2018-04-30 | End: 2019-03-14

## 2019-03-14 RX ORDER — RANITIDINE 300 MG/1
300 TABLET ORAL
Qty: 90 | Refills: 1 | Status: DISCONTINUED | COMMUNITY
Start: 2018-04-30 | End: 2019-03-14

## 2019-03-14 NOTE — PHYSICAL EXAM
[General Appearance - Well Developed] : well developed [Normal Appearance] : normal appearance [Well Groomed] : well groomed [General Appearance - Well Nourished] : well nourished [No Deformities] : no deformities [General Appearance - In No Acute Distress] : no acute distress [Normal Conjunctiva] : the conjunctiva exhibited no abnormalities [Eyelids - No Xanthelasma] : the eyelids demonstrated no xanthelasmas [Normal Oral Mucosa] : normal oral mucosa [No Oral Pallor] : no oral pallor [No Oral Cyanosis] : no oral cyanosis [Normal Jugular Venous A Waves Present] : normal jugular venous A waves present [Normal Jugular Venous V Waves Present] : normal jugular venous V waves present [No Jugular Venous Correa A Waves] : no jugular venous correa A waves [Respiration, Rhythm And Depth] : normal respiratory rhythm and effort [Exaggerated Use Of Accessory Muscles For Inspiration] : no accessory muscle use [Auscultation Breath Sounds / Voice Sounds] : lungs were clear to auscultation bilaterally [Heart Rate And Rhythm] : heart rate and rhythm were normal [Heart Sounds] : normal S1 and S2 [Systolic grade ___/6] : A grade [unfilled]/6 systolic murmur was heard. [Abdomen Soft] : soft [Abdomen Tenderness] : non-tender [Abdomen Mass (___ Cm)] : no abdominal mass palpated [Abnormal Walk] : normal gait [Gait - Sufficient For Exercise Testing] : the gait was sufficient for exercise testing [Nail Clubbing] : no clubbing of the fingernails [Cyanosis, Localized] : no localized cyanosis [Petechial Hemorrhages (___cm)] : no petechial hemorrhages [Skin Color & Pigmentation] : normal skin color and pigmentation [] : no rash [No Venous Stasis] : no venous stasis [Skin Lesions] : no skin lesions [No Skin Ulcers] : no skin ulcer [No Xanthoma] : no  xanthoma was observed [Oriented To Time, Place, And Person] : oriented to person, place, and time [Affect] : the affect was normal [Mood] : the mood was normal [No Anxiety] : not feeling anxious

## 2019-03-14 NOTE — DISCUSSION/SUMMARY
[FreeTextEntry1] : 1) He will remain on the Eliquis\par 2) I will maintain the Beta blocker for now\par 3) He will continue his other medications.\par 4) Echo to evaluate Aortic murmur\par 5) F/u in 6 months

## 2019-03-14 NOTE — HISTORY OF PRESENT ILLNESS
[Good] : Good [Cardiovascular Disease] : cardiovascular disease [Anti-Platelet Agents] : anti-platelet agents [Frequent Aspirin Use] : frequent aspirin use [Prior Anesthesia] : Prior anesthesia [Electrocardiogram] : ~T an ECG ~C was performed [Echocardiogram] : ~T an echocardiogram ~C was performed [Cardiovascular Stress Test] : a cardiac stress test ~T ~C was performed [FreeTextEntry1] : he has no chest pain\par He has some shortness of breath\par He has no palpitations\par He has no syncope\par He is neurologically intact\par He has no edema\par He has no GI symptoms\par Had a Nuclear stress test 3/18 which was normal\par \par  [Fever] : no fever [Chills] : no chills [Fatigue] : no fatigue [Chest Pain] : no chest pain [Cough] : no cough [Dyspnea] : no dyspnea [Dysuria] : no dysuria [Urinary Frequency] : no urinary frequency [Nausea] : no nausea [Vomiting] : no vomiting [Diarrhea] : no diarrhea [Abdominal Pain] : no abdominal pain [Easy Bruising] : no easy bruising [Lower Extremity Swelling] : no lower extremity swelling [Poor Exercise Tolerance] : no poor exercise tolerance [Diabetes] : no diabetes [Pulmonary Disease] : no pulmonary disease [Nicotine Dependence] : no nicotine dependence [Alcohol Use] : no  alcohol use [Renal Disease] : no renal disease [GI Disease] : no gastrointestinal disease [Sleep Apnea] : no sleep apnea [Thromboembolic Problems] : no thromboembolic problems [Frequent use of NSAIDs] : no use of NSAIDs [Transfusion Reaction] : no transfusion reaction [Impaired Immunity] : no impaired immunity [Steroid Use in Last 6 Months] : no steroid use in the last six months [Prev Anesthesia Reaction] : no previous anesthesia reaction [Anesthesia Reaction] : no anesthesia reaction [Sudden Death] : no sudden death [Clotting Disorder] : no clotting disorder [Bleeding Disorder] : no bleeding disorder

## 2019-03-14 NOTE — REASON FOR VISIT
[Follow-Up - Clinic] : a clinic follow-up of [Atrial Fibrillation] : atrial fibrillation [Coronary Artery Disease] : coronary artery disease [Hyperlipidemia] : hyperlipidemia [Peripheral Vascular Disease] : peripheral vascular disease [FreeTextEntry1] : I saw this 73-year-old man in followup consultation on 03/14/19\par He had coronary artery stenting done in 2006 with 5 stents. He had a popliteal bypass of that same year.\par He had atrial fibrillation ablation in 2010, and again 10 months ago. He takes medication for his lipids and is asymptomatic and doing well.\par He had a nuclear stress test which showed NO ischemia\par He recently had a thoracic procedure which  enhanced his breathing\par He comes in for a routine f/u with no complaints

## 2019-04-01 ENCOUNTER — RX RENEWAL (OUTPATIENT)
Age: 74
End: 2019-04-01

## 2019-06-06 ENCOUNTER — RX RENEWAL (OUTPATIENT)
Age: 74
End: 2019-06-06

## 2019-06-17 ENCOUNTER — APPOINTMENT (OUTPATIENT)
Dept: CARDIOLOGY | Facility: CLINIC | Age: 74
End: 2019-06-17
Payer: MEDICARE

## 2019-06-17 PROCEDURE — 93306 TTE W/DOPPLER COMPLETE: CPT

## 2019-07-11 ENCOUNTER — APPOINTMENT (OUTPATIENT)
Dept: THORACIC SURGERY | Facility: CLINIC | Age: 74
End: 2019-07-11
Payer: MEDICARE

## 2019-07-11 VITALS
RESPIRATION RATE: 19 BRPM | HEIGHT: 68 IN | WEIGHT: 170 LBS | DIASTOLIC BLOOD PRESSURE: 77 MMHG | HEART RATE: 81 BPM | OXYGEN SATURATION: 97 % | SYSTOLIC BLOOD PRESSURE: 126 MMHG | BODY MASS INDEX: 25.76 KG/M2 | TEMPERATURE: 98 F

## 2019-07-11 PROCEDURE — 99214 OFFICE O/P EST MOD 30 MIN: CPT

## 2019-07-11 RX ORDER — MOMETASONE FUROATE AND FORMOTEROL FUMARATE DIHYDRATE 200; 5 UG/1; UG/1
200-5 AEROSOL RESPIRATORY (INHALATION)
Refills: 0 | Status: DISCONTINUED | COMMUNITY
End: 2019-07-11

## 2019-07-11 NOTE — PHYSICAL EXAM
[Skin Turgor] : normal skin turgor [Skin Color & Pigmentation] : normal skin color and pigmentation [] : no respiratory distress [Respiration, Rhythm And Depth] : normal respiratory rhythm and effort [Exaggerated Use Of Accessory Muscles For Inspiration] : no accessory muscle use [Auscultation Breath Sounds / Voice Sounds] : lungs were clear to auscultation bilaterally [Apical Impulse] : the apical impulse was normal [Heart Sounds] : normal S1 and S2 [Examination Of The Chest] : the chest was normal in appearance [Oriented To Time, Place, And Person] : oriented to person, place, and time

## 2019-07-24 NOTE — END OF VISIT
[FreeTextEntry3] : All medical record entries made by the Scribe were at my, Dr. Solo's direction and personally dictated by me on 07/11/2019 . I have reviewed the chart and agree that the record accurately reflects my personal performance of the history, physical exam, assessment and plan. I have also personally directed, reviewed, and agreed with the chart.

## 2019-07-24 NOTE — HISTORY OF PRESENT ILLNESS
[FreeTextEntry1] : 73 year old male, former smoker, with a PMH of asthma, asbestos exposure, CAD, HLD, PAF, s/p cardiac stent placement in 2006, s/p cardiac ablation in 2010 and 2017, laryngopharyngeal reflux disease, post nasal drip. He status post Bronchoscopy, Right VATs, Robotic Assisted, Tracheobronchoplasty with Prolene Mesh on 8/14/18.  \par \par PFT done on 8/01/18 showed FEV1 = 3.57, 126% of predicted value, FVC = 4.29, 119% of predicted value, PEF = 10.10, 132% of predicted value and DLCO = 18.8, 103% of predicted value.\par  \par PFT done on 1/3/19 showed FEV1 = 3.14, 117% of predicted value, FVC = 3.31, 89% of predicted value, PEF = 10.70, 150% of predicted value and DLCO = 26.2, 110% of predicted value.\par \par He presents today for a follow-up visit. Patient doing well, staying active. He walks about 1.5 miles a day and plays baseball with his grandchildren. No longer need Dulera inhaler. Denies SOB, cough, hemoptysis, chest discomfort, fever/chills. \par

## 2019-09-04 ENCOUNTER — MEDICATION RENEWAL (OUTPATIENT)
Age: 74
End: 2019-09-04

## 2019-09-24 ENCOUNTER — NON-APPOINTMENT (OUTPATIENT)
Age: 74
End: 2019-09-24

## 2019-09-24 ENCOUNTER — APPOINTMENT (OUTPATIENT)
Dept: CARDIOLOGY | Facility: CLINIC | Age: 74
End: 2019-09-24
Payer: MEDICARE

## 2019-09-24 VITALS
HEART RATE: 77 BPM | SYSTOLIC BLOOD PRESSURE: 118 MMHG | HEIGHT: 68 IN | BODY MASS INDEX: 25.76 KG/M2 | WEIGHT: 170 LBS | DIASTOLIC BLOOD PRESSURE: 78 MMHG | OXYGEN SATURATION: 97 %

## 2019-09-24 DIAGNOSIS — I35.1 NONRHEUMATIC AORTIC (VALVE) INSUFFICIENCY: ICD-10-CM

## 2019-09-24 PROCEDURE — 93000 ELECTROCARDIOGRAM COMPLETE: CPT

## 2019-09-24 PROCEDURE — 99215 OFFICE O/P EST HI 40 MIN: CPT

## 2019-09-24 RX ORDER — MONTELUKAST SODIUM 10 MG/1
10 TABLET, FILM COATED ORAL
Qty: 1 | Refills: 1 | Status: DISCONTINUED | OUTPATIENT
Start: 2018-03-20 | End: 2019-09-24

## 2019-09-24 NOTE — PHYSICAL EXAM
[General Appearance - Well Developed] : well developed [Normal Appearance] : normal appearance [Well Groomed] : well groomed [General Appearance - Well Nourished] : well nourished [No Deformities] : no deformities [General Appearance - In No Acute Distress] : no acute distress [Normal Conjunctiva] : the conjunctiva exhibited no abnormalities [Eyelids - No Xanthelasma] : the eyelids demonstrated no xanthelasmas [Normal Oral Mucosa] : normal oral mucosa [No Oral Pallor] : no oral pallor [No Oral Cyanosis] : no oral cyanosis [Normal Jugular Venous A Waves Present] : normal jugular venous A waves present [Normal Jugular Venous V Waves Present] : normal jugular venous V waves present [No Jugular Venous Correa A Waves] : no jugular venous correa A waves [Respiration, Rhythm And Depth] : normal respiratory rhythm and effort [Exaggerated Use Of Accessory Muscles For Inspiration] : no accessory muscle use [Auscultation Breath Sounds / Voice Sounds] : lungs were clear to auscultation bilaterally [Heart Rate And Rhythm] : heart rate and rhythm were normal [Systolic grade ___/6] : A grade [unfilled]/6 systolic murmur was heard. [Heart Sounds] : normal S1 and S2 [Abdomen Soft] : soft [Abdomen Tenderness] : non-tender [Abdomen Mass (___ Cm)] : no abdominal mass palpated [Abnormal Walk] : normal gait [Gait - Sufficient For Exercise Testing] : the gait was sufficient for exercise testing [Nail Clubbing] : no clubbing of the fingernails [Petechial Hemorrhages (___cm)] : no petechial hemorrhages [Cyanosis, Localized] : no localized cyanosis [] : no rash [Skin Color & Pigmentation] : normal skin color and pigmentation [No Venous Stasis] : no venous stasis [Skin Lesions] : no skin lesions [No Skin Ulcers] : no skin ulcer [No Xanthoma] : no  xanthoma was observed [Oriented To Time, Place, And Person] : oriented to person, place, and time [Affect] : the affect was normal [Mood] : the mood was normal [No Anxiety] : not feeling anxious

## 2019-09-24 NOTE — HISTORY OF PRESENT ILLNESS
[Good] : Good [Cardiovascular Disease] : cardiovascular disease [Anti-Platelet Agents] : anti-platelet agents [Frequent Aspirin Use] : frequent aspirin use [Prior Anesthesia] : Prior anesthesia [Electrocardiogram] : ~T an ECG ~C was performed [Echocardiogram] : ~T an echocardiogram ~C was performed [Cardiovascular Stress Test] : a cardiac stress test ~T ~C was performed [FreeTextEntry1] : he has no chest pain\par He has some shortness of breath\par He has no palpitations\par He has no syncope\par He is neurologically intact\par He has no edema\par He has no GI symptoms\par Had a Nuclear stress test 3/18 which was normal\par \par  [Fever] : no fever [Fatigue] : no fatigue [Chills] : no chills [Chest Pain] : no chest pain [Dyspnea] : no dyspnea [Cough] : no cough [Dysuria] : no dysuria [Urinary Frequency] : no urinary frequency [Nausea] : no nausea [Vomiting] : no vomiting [Diarrhea] : no diarrhea [Abdominal Pain] : no abdominal pain [Easy Bruising] : no easy bruising [Lower Extremity Swelling] : no lower extremity swelling [Poor Exercise Tolerance] : no poor exercise tolerance [Diabetes] : no diabetes [Nicotine Dependence] : no nicotine dependence [Pulmonary Disease] : no pulmonary disease [Alcohol Use] : no  alcohol use [Renal Disease] : no renal disease [GI Disease] : no gastrointestinal disease [Sleep Apnea] : no sleep apnea [Thromboembolic Problems] : no thromboembolic problems [Frequent use of NSAIDs] : no use of NSAIDs [Transfusion Reaction] : no transfusion reaction [Impaired Immunity] : no impaired immunity [Steroid Use in Last 6 Months] : no steroid use in the last six months [Prev Anesthesia Reaction] : no previous anesthesia reaction [Anesthesia Reaction] : no anesthesia reaction [Sudden Death] : no sudden death [Clotting Disorder] : no clotting disorder [Bleeding Disorder] : no bleeding disorder

## 2019-09-24 NOTE — DISCUSSION/SUMMARY
[FreeTextEntry1] : 1) He will remain on the Eliquis\par 2) I will maintain the Beta blocker for now\par 3) He will continue his other medications.\par 4) Echo to evaluate Aortic murmur again next summer\par 5) F/u in 6 months

## 2019-09-24 NOTE — REASON FOR VISIT
[Follow-Up - Clinic] : a clinic follow-up of [Atrial Fibrillation] : atrial fibrillation [Coronary Artery Disease] : coronary artery disease [Hyperlipidemia] : hyperlipidemia [Peripheral Vascular Disease] : peripheral vascular disease [FreeTextEntry1] : I saw this 73-year-old man in followup consultation on 09/24/19\par He had coronary artery stenting done in 2006 with 5 stents. He had a popliteal bypass of that same year.\par He had atrial fibrillation ablation in 2010, and again 10 months ago. He takes medication for his lipids and is asymptomatic and doing well.\par He had a nuclear stress test which showed NO ischemia\par He recently had a thoracic procedure which  enhanced his breathing\par He comes in for a routine f/u with no complaints\par His lipids are well controlled

## 2020-01-27 ENCOUNTER — EMERGENCY (EMERGENCY)
Facility: HOSPITAL | Age: 75
LOS: 1 days | End: 2020-01-27
Admitting: EMERGENCY MEDICINE
Payer: MEDICARE

## 2020-01-27 DIAGNOSIS — J34.2 DEVIATED NASAL SEPTUM: Chronic | ICD-10-CM

## 2020-01-27 DIAGNOSIS — Z98.890 OTHER SPECIFIED POSTPROCEDURAL STATES: Chronic | ICD-10-CM

## 2020-01-27 DIAGNOSIS — Z86.79 PERSONAL HISTORY OF OTHER DISEASES OF THE CIRCULATORY SYSTEM: Chronic | ICD-10-CM

## 2020-01-27 DIAGNOSIS — Z90.49 ACQUIRED ABSENCE OF OTHER SPECIFIED PARTS OF DIGESTIVE TRACT: Chronic | ICD-10-CM

## 2020-01-27 PROCEDURE — 99283 EMERGENCY DEPT VISIT LOW MDM: CPT | Mod: 25

## 2020-01-27 PROCEDURE — G0168: CPT

## 2020-01-28 ENCOUNTER — CLINICAL ADVICE (OUTPATIENT)
Age: 75
End: 2020-01-28

## 2020-03-17 NOTE — DISCHARGE NOTE ADULT - FUNCTIONAL SCREEN CURRENT LEVEL: AMBULATION, MLM
Spoke with pt and informed him there is not a doctor here but I will speak with one tomorrow   (0) independent

## 2020-03-24 NOTE — ASU PATIENT PROFILE, ADULT - TEACHING/LEARNING FACTORS IMPACT ABILITY TO LEARN
PHYSICAL THERAPY TREATMENT NOTE - INPATIENT    Room Number: 8777/2725-U     Session: 5  Number of Visits to Meet Established Goals: 5    Presenting Problem: Pneumonia, Ascending Aortic Dissection     History related to current admission:  Pt is a 74 y.o. • Heart attack ([facility] [address] 10/2017   • High blood pressure    • High cholesterol    • Hyperlipidemia    • Osteoarthritis    • Shortness of breath    • Sleep apnea        Past Surgical History  Past Surgical History:   Procedure Laterality Date   • BRAIN SURGER railing?: Total       AM-PAC Score:  Raw Score: 16   Approx Degree of Impairment: 54.16%   Standardized Score (AM-PAC Scale): 40.78   CMS Modifier (G-Code): CK    FUNCTIONAL ABILITY STATUS  Gait Assessment   Gait Assistance: Contact guard assist  Distance 145 during mobility. At this time, pt continues to need assist during transfers, bed mobility and gait training. Pt was alert and oriented during session.      DISCHARGE RECOMMENDATIONS  PT Discharge Recommendations: Acute rehabilitation     PLAN  PT Treatm none

## 2020-03-29 ENCOUNTER — RX RENEWAL (OUTPATIENT)
Age: 75
End: 2020-03-29

## 2020-03-31 ENCOUNTER — APPOINTMENT (OUTPATIENT)
Dept: CARDIOLOGY | Facility: CLINIC | Age: 75
End: 2020-03-31

## 2020-04-28 NOTE — ASU PATIENT PROFILE, ADULT - PRO ARRIVE FROM
Pre existing DM during pregnancy in the first trimester  IVIS: 10/1/2020  17w5d  Diet controlled    43% of post dinner readings above goal - likely dietary related as noted. Will call patient to discuss.       
Writer contacted the patient with the assistance of  services. I discussed her higher blood sugar readings after her supper meals as written. The patient did recognize after she had consumed the pizza, the tacos and the hot dogs that she had eaten \"too much\".  I did review the dietary guidelines with her. We did discuss how much pizza and how many tacos would be appropriate to consume. I also encouraged her to take a walk about 30 minutes after dinner if ok with her OB in the cases that she does over eat a this can be helpful as well as drinking water. The patient stated that she understood. The patient was praised for her efforts in monitoring her blood sugars and for also recognizing when she has gone off of her diet and knows it by providing us that documentation. She will send us a record of her blood sugar readings in a week. She knows to call with any other questions or concerns.       Qing Vallejo RN/CDE    
home

## 2020-05-28 ENCOUNTER — APPOINTMENT (OUTPATIENT)
Dept: CARDIOLOGY | Facility: CLINIC | Age: 75
End: 2020-05-28
Payer: MEDICARE

## 2020-05-28 ENCOUNTER — NON-APPOINTMENT (OUTPATIENT)
Age: 75
End: 2020-05-28

## 2020-05-28 VITALS
TEMPERATURE: 98 F | HEIGHT: 68 IN | OXYGEN SATURATION: 97 % | DIASTOLIC BLOOD PRESSURE: 70 MMHG | SYSTOLIC BLOOD PRESSURE: 112 MMHG | HEART RATE: 67 BPM | WEIGHT: 168 LBS | BODY MASS INDEX: 25.46 KG/M2

## 2020-05-28 PROCEDURE — 93000 ELECTROCARDIOGRAM COMPLETE: CPT

## 2020-05-28 PROCEDURE — 99215 OFFICE O/P EST HI 40 MIN: CPT

## 2020-05-28 RX ORDER — CHROMIUM 200 MCG
TABLET ORAL
Refills: 0 | Status: ACTIVE | COMMUNITY

## 2020-05-28 RX ORDER — VITAMIN E ACID SUCCINATE 268 MG
TABLET ORAL
Refills: 0 | Status: ACTIVE | COMMUNITY

## 2020-05-28 RX ORDER — MULTIVIT-MINS/IRON/FOLIC/LYCOP 8-200-600
TABLET ORAL
Refills: 0 | Status: ACTIVE | COMMUNITY

## 2020-05-28 RX ORDER — COLD-HOT PACK
EACH MISCELLANEOUS
Refills: 0 | Status: ACTIVE | COMMUNITY

## 2020-05-28 RX ORDER — MULTIVIT-MIN/IRON/FOLIC ACID/K 18-600-40
CAPSULE ORAL
Refills: 0 | Status: ACTIVE | COMMUNITY

## 2020-05-28 NOTE — PHYSICAL EXAM
[General Appearance - Well Developed] : well developed [Normal Appearance] : normal appearance [Well Groomed] : well groomed [General Appearance - In No Acute Distress] : no acute distress [No Deformities] : no deformities [General Appearance - Well Nourished] : well nourished [Eyelids - No Xanthelasma] : the eyelids demonstrated no xanthelasmas [Normal Conjunctiva] : the conjunctiva exhibited no abnormalities [Normal Oral Mucosa] : normal oral mucosa [No Oral Pallor] : no oral pallor [No Oral Cyanosis] : no oral cyanosis [Normal Jugular Venous A Waves Present] : normal jugular venous A waves present [Normal Jugular Venous V Waves Present] : normal jugular venous V waves present [No Jugular Venous Correa A Waves] : no jugular venous correa A waves [Respiration, Rhythm And Depth] : normal respiratory rhythm and effort [Exaggerated Use Of Accessory Muscles For Inspiration] : no accessory muscle use [Heart Sounds] : normal S1 and S2 [Heart Rate And Rhythm] : heart rate and rhythm were normal [Auscultation Breath Sounds / Voice Sounds] : lungs were clear to auscultation bilaterally [Systolic grade ___/6] : A grade [unfilled]/6 systolic murmur was heard. [Abdomen Tenderness] : non-tender [Abdomen Soft] : soft [Abdomen Mass (___ Cm)] : no abdominal mass palpated [Abnormal Walk] : normal gait [Gait - Sufficient For Exercise Testing] : the gait was sufficient for exercise testing [Cyanosis, Localized] : no localized cyanosis [Nail Clubbing] : no clubbing of the fingernails [Petechial Hemorrhages (___cm)] : no petechial hemorrhages [Skin Color & Pigmentation] : normal skin color and pigmentation [] : no rash [No Venous Stasis] : no venous stasis [No Skin Ulcers] : no skin ulcer [Skin Lesions] : no skin lesions [No Xanthoma] : no  xanthoma was observed [Affect] : the affect was normal [Oriented To Time, Place, And Person] : oriented to person, place, and time [No Anxiety] : not feeling anxious [Mood] : the mood was normal

## 2020-05-28 NOTE — DISCUSSION/SUMMARY
[Procedure Low Risk] : the procedure risk is low [Patient Low Risk] : the patient is a low surgical risk [Optimized for Surgery] : the patient is optimized for surgery [As per surgery] : as per surgery [Continue] : Continue medications as currently directed [FreeTextEntry1] : 1) He will remain on the Eliquis but can stop it 3 days before surgery. Must remain on aspirin 81mg.\par 2) I will maintain the Beta blocker for now\par 3) He will continue his other medications.\par 4) Echo to evaluate Aortic murmur again next summer\par 5) F/u in 6 months

## 2020-05-28 NOTE — HISTORY OF PRESENT ILLNESS
[Preoperative Visit] : for a medical evaluation prior to surgery [Scheduled Procedure ___] : a [unfilled] [Good] : Good [Cardiovascular Disease] : cardiovascular disease [Anti-Platelet Agents] : anti-platelet agents [Frequent Aspirin Use] : frequent aspirin use [Prior Anesthesia] : Prior anesthesia [Electrocardiogram] : ~T an ECG ~C was performed [Echocardiogram] : ~T an echocardiogram ~C was performed [Cardiovascular Stress Test] : a cardiac stress test ~T ~C was performed [Fever] : no fever [Chills] : no chills [Fatigue] : no fatigue [Chest Pain] : no chest pain [Cough] : no cough [Dyspnea] : no dyspnea [Dysuria] : no dysuria [Urinary Frequency] : no urinary frequency [Nausea] : no nausea [Vomiting] : no vomiting [Diarrhea] : no diarrhea [Abdominal Pain] : no abdominal pain [Easy Bruising] : no easy bruising [Lower Extremity Swelling] : no lower extremity swelling [Poor Exercise Tolerance] : no poor exercise tolerance [Diabetes] : no diabetes [Pulmonary Disease] : no pulmonary disease [Nicotine Dependence] : no nicotine dependence [Alcohol Use] : no  alcohol use [Renal Disease] : no renal disease [GI Disease] : no gastrointestinal disease [Sleep Apnea] : no sleep apnea [Thromboembolic Problems] : no thromboembolic problems [Frequent use of NSAIDs] : no use of NSAIDs [Transfusion Reaction] : no transfusion reaction [Impaired Immunity] : no impaired immunity [Steroid Use in Last 6 Months] : no steroid use in the last six months [Prev Anesthesia Reaction] : no previous anesthesia reaction [Anesthesia Reaction] : no anesthesia reaction [Sudden Death] : no sudden death [Clotting Disorder] : no clotting disorder [Bleeding Disorder] : no bleeding disorder [FreeTextEntry1] : he has no chest pain\par He has some shortness of breath\par He has no palpitations\par He has no syncope\par He is neurologically intact\par He has no edema\par He has no GI symptoms\par Had a Nuclear stress test 3/18 which was normal\par \par

## 2020-09-08 NOTE — HISTORY OF PRESENT ILLNESS
[FreeTextEntry1] : 74 year old male, former smoker, with a PMH of asthma, asbestos exposure, CAD, HLD, PAF, s/p cardiac stent placement in 2006, s/p cardiac ablation in 2010 and 2017, laryngopharyngeal reflux disease, post nasal drip. He status post Bronchoscopy, Right VATs, Robotic Assisted, Tracheobronchoplasty with Prolene Mesh on 8/14/18.  He presents today for a follow up visit. \par \par PFT done on 8/01/18 showed FEV1 = 3.57, 126% of predicted value, FVC = 4.29, 119% of predicted value, PEF = 10.10, 132% of predicted value and DLCO = 18.8, 103% of predicted value.\par  \par PFT done on 1/3/19 showed FEV1 = 3.14, 117% of predicted value, FVC = 3.31, 89% of predicted value, PEF = 10.70, 150% of predicted value and DLCO = 26.2, 110% of predicted value.\par

## 2020-09-10 ENCOUNTER — APPOINTMENT (OUTPATIENT)
Dept: THORACIC SURGERY | Facility: CLINIC | Age: 75
End: 2020-09-10

## 2020-11-30 ENCOUNTER — RX RENEWAL (OUTPATIENT)
Age: 75
End: 2020-11-30

## 2020-12-11 NOTE — BRIEF OPERATIVE NOTE - PROCEDURE
<<-----Click on this checkbox to enter Procedure Bronchoplasty  08/14/2018  Bronchoscopy, Right VATs, Robotic Assisted, Tracheobronchoplasty with Prolene Mesh  Active  NORMA Detail Level: Generalized Detail Level: Simple

## 2020-12-28 ENCOUNTER — RX RENEWAL (OUTPATIENT)
Age: 75
End: 2020-12-28

## 2021-04-20 ENCOUNTER — APPOINTMENT (OUTPATIENT)
Dept: CARDIOLOGY | Facility: CLINIC | Age: 76
End: 2021-04-20
Payer: MEDICARE

## 2021-04-20 ENCOUNTER — NON-APPOINTMENT (OUTPATIENT)
Age: 76
End: 2021-04-20

## 2021-04-20 VITALS
DIASTOLIC BLOOD PRESSURE: 82 MMHG | HEIGHT: 68 IN | WEIGHT: 170 LBS | OXYGEN SATURATION: 99 % | BODY MASS INDEX: 25.76 KG/M2 | SYSTOLIC BLOOD PRESSURE: 130 MMHG | TEMPERATURE: 97.2 F | HEART RATE: 67 BPM

## 2021-04-20 PROCEDURE — 93000 ELECTROCARDIOGRAM COMPLETE: CPT

## 2021-04-20 PROCEDURE — 99215 OFFICE O/P EST HI 40 MIN: CPT

## 2021-04-20 PROCEDURE — 93242 EXT ECG>48HR<7D RECORDING: CPT

## 2021-04-20 NOTE — REASON FOR VISIT
[Follow-Up - Clinic] : a clinic follow-up of [Atrial Fibrillation] : atrial fibrillation [Coronary Artery Disease] : coronary artery disease [Hyperlipidemia] : hyperlipidemia [Peripheral Vascular Disease] : peripheral vascular disease [FreeTextEntry1] : I saw this 75-year-old man in followup consultation on 04/20/21\par He had coronary artery stenting done in 2006 with 5 stents. He had a popliteal bypass of that same year.\par He had atrial fibrillation ablation in 2010, and again in 2018.. He takes medication for his lipids and is asymptomatic and doing well.\par He had a nuclear stress test which showed NO ischemia\par He recently had a thoracic procedure which  enhanced his breathing\par He comes in for a routine f/u with no complaints\par His lipids are well controlled

## 2021-04-20 NOTE — HISTORY OF PRESENT ILLNESS
[Good] : Good [Cardiovascular Disease] : cardiovascular disease [Anti-Platelet Agents] : anti-platelet agents [Frequent Aspirin Use] : frequent aspirin use [Prior Anesthesia] : Prior anesthesia [Electrocardiogram] : ~T an ECG ~C was performed [Echocardiogram] : ~T an echocardiogram ~C was performed [Cardiovascular Stress Test] : a cardiac stress test ~T ~C was performed [FreeTextEntry1] : he has no chest pain\par He has some shortness of breath\par He has no palpitations\par He has no syncope\par He is neurologically intact\par He has no edema\par He has no GI symptoms\par Had a Nuclear stress test 3/18 which was normal\par \par  [Fever] : no fever [Chills] : no chills [Fatigue] : no fatigue [Chest Pain] : no chest pain [Cough] : no cough [Dyspnea] : no dyspnea [Dysuria] : no dysuria [Urinary Frequency] : no urinary frequency [Nausea] : no nausea [Diarrhea] : no diarrhea [Vomiting] : no vomiting [Abdominal Pain] : no abdominal pain [Easy Bruising] : no easy bruising [Lower Extremity Swelling] : no lower extremity swelling [Poor Exercise Tolerance] : no poor exercise tolerance [Diabetes] : no diabetes [Pulmonary Disease] : no pulmonary disease [Nicotine Dependence] : no nicotine dependence [Alcohol Use] : no  alcohol use [Renal Disease] : no renal disease [GI Disease] : no gastrointestinal disease [Sleep Apnea] : no sleep apnea [Thromboembolic Problems] : no thromboembolic problems [Frequent use of NSAIDs] : no use of NSAIDs [Transfusion Reaction] : no transfusion reaction [Impaired Immunity] : no impaired immunity [Steroid Use in Last 6 Months] : no steroid use in the last six months [Prev Anesthesia Reaction] : no previous anesthesia reaction [Anesthesia Reaction] : no anesthesia reaction [Sudden Death] : no sudden death [Clotting Disorder] : no clotting disorder [Bleeding Disorder] : no bleeding disorder

## 2021-04-20 NOTE — DISCUSSION/SUMMARY
[FreeTextEntry1] : 1) He will remain on the Eliquis \par 2) I will maintain the Beta blocker for now\par 3) He will continue his other medications.\par 4) Echo to evaluate Aortic murmur \par 5) F/u in 6 months\par 7) 3-day Zio patch to evaluate for PAF.  Patient wants to come off Eliquis

## 2021-04-27 NOTE — ASU PATIENT PROFILE, ADULT - HARM RISK FACTORS
History and Physical Preoperative Update Note    I reviewed the history and physical note (H&P) from Dr. Gifty Toro DO on 4/19/2021 with the patient, and there are no changes in the patient's condition. Confirmed consent, including understanding of the risks, benefits and alternatives for T10 laminotomy and implantation of paddle epidural spinal cord electrode and removal of prior electrodes. Operative site was marked. I discussed the plan with the Patient and Family and answered all of their questions.    Tucker Hui MD  Neurosurgery  [facility]  Office [phone]  Pager [phone]     no

## 2021-04-29 ENCOUNTER — NON-APPOINTMENT (OUTPATIENT)
Age: 76
End: 2021-04-29

## 2021-05-04 ENCOUNTER — APPOINTMENT (OUTPATIENT)
Dept: CARDIOLOGY | Facility: CLINIC | Age: 76
End: 2021-05-04
Payer: MEDICARE

## 2021-05-04 PROCEDURE — 93306 TTE W/DOPPLER COMPLETE: CPT

## 2021-05-06 PROCEDURE — 93244 EXT ECG>48HR<7D REV&INTERPJ: CPT

## 2021-05-07 ENCOUNTER — NON-APPOINTMENT (OUTPATIENT)
Age: 76
End: 2021-05-07

## 2021-05-11 ENCOUNTER — APPOINTMENT (OUTPATIENT)
Dept: CARDIOLOGY | Facility: CLINIC | Age: 76
End: 2021-05-11
Payer: MEDICARE

## 2021-05-11 VITALS
BODY MASS INDEX: 25.76 KG/M2 | TEMPERATURE: 98 F | WEIGHT: 170 LBS | DIASTOLIC BLOOD PRESSURE: 80 MMHG | OXYGEN SATURATION: 98 % | HEART RATE: 69 BPM | HEIGHT: 68 IN | SYSTOLIC BLOOD PRESSURE: 132 MMHG

## 2021-05-11 PROCEDURE — 99215 OFFICE O/P EST HI 40 MIN: CPT

## 2021-05-11 NOTE — DISCUSSION/SUMMARY
[FreeTextEntry1] : 1) He will remain on the Eliquis \par 2) I will maintain the Beta blocker for now\par 3) He will continue his other medications.\par 4) Echo to evaluate Aortic murmur showed moderate Aortic stenosis. Repeat in a year\par 5) F/u in 6 months\par

## 2021-05-11 NOTE — HISTORY OF PRESENT ILLNESS
[Good] : Good [Cardiovascular Disease] : cardiovascular disease [Anti-Platelet Agents] : anti-platelet agents [Frequent Aspirin Use] : frequent aspirin use [Prior Anesthesia] : Prior anesthesia [Electrocardiogram] : ~T an ECG ~C was performed [Echocardiogram] : ~T an echocardiogram ~C was performed [Cardiovascular Stress Test] : a cardiac stress test ~T ~C was performed [FreeTextEntry1] : he has no chest pain\par He has some shortness of breath\par He has no palpitations\par He has no syncope\par He is neurologically intact\par He has no edema\par He has no GI symptoms\par \par \par  [Fever] : no fever [Chills] : no chills [Fatigue] : no fatigue [Chest Pain] : no chest pain [Cough] : no cough [Dyspnea] : no dyspnea [Dysuria] : no dysuria [Urinary Frequency] : no urinary frequency [Nausea] : no nausea [Vomiting] : no vomiting [Diarrhea] : no diarrhea [Abdominal Pain] : no abdominal pain [Easy Bruising] : no easy bruising [Lower Extremity Swelling] : no lower extremity swelling [Poor Exercise Tolerance] : no poor exercise tolerance [Diabetes] : no diabetes [Pulmonary Disease] : no pulmonary disease [Nicotine Dependence] : no nicotine dependence [Alcohol Use] : no  alcohol use [Renal Disease] : no renal disease [GI Disease] : no gastrointestinal disease [Sleep Apnea] : no sleep apnea [Thromboembolic Problems] : no thromboembolic problems [Frequent use of NSAIDs] : no use of NSAIDs [Transfusion Reaction] : no transfusion reaction [Impaired Immunity] : no impaired immunity [Steroid Use in Last 6 Months] : no steroid use in the last six months [Prev Anesthesia Reaction] : no previous anesthesia reaction [Anesthesia Reaction] : no anesthesia reaction [Sudden Death] : no sudden death [Clotting Disorder] : no clotting disorder [Bleeding Disorder] : no bleeding disorder

## 2021-05-11 NOTE — PHYSICAL EXAM
[General Appearance - Well Developed] : well developed [Normal Appearance] : normal appearance [Well Groomed] : well groomed [No Deformities] : no deformities [General Appearance - Well Nourished] : well nourished [General Appearance - In No Acute Distress] : no acute distress [Normal Conjunctiva] : the conjunctiva exhibited no abnormalities [Eyelids - No Xanthelasma] : the eyelids demonstrated no xanthelasmas [Normal Oral Mucosa] : normal oral mucosa [No Oral Pallor] : no oral pallor [No Oral Cyanosis] : no oral cyanosis [Normal Jugular Venous A Waves Present] : normal jugular venous A waves present [Normal Jugular Venous V Waves Present] : normal jugular venous V waves present [No Jugular Venous Correa A Waves] : no jugular venous correa A waves [Respiration, Rhythm And Depth] : normal respiratory rhythm and effort [Exaggerated Use Of Accessory Muscles For Inspiration] : no accessory muscle use [Auscultation Breath Sounds / Voice Sounds] : lungs were clear to auscultation bilaterally [Heart Rate And Rhythm] : heart rate and rhythm were normal [Heart Sounds] : normal S1 and S2 [Systolic grade ___/6] : A grade [unfilled]/6 systolic murmur was heard. [Abdomen Soft] : soft [Abdomen Tenderness] : non-tender [Abdomen Mass (___ Cm)] : no abdominal mass palpated [Abnormal Walk] : normal gait [Gait - Sufficient For Exercise Testing] : the gait was sufficient for exercise testing [Nail Clubbing] : no clubbing of the fingernails [Cyanosis, Localized] : no localized cyanosis [Petechial Hemorrhages (___cm)] : no petechial hemorrhages [Skin Color & Pigmentation] : normal skin color and pigmentation [] : no rash [No Venous Stasis] : no venous stasis [Skin Lesions] : no skin lesions [No Skin Ulcers] : no skin ulcer [No Xanthoma] : no  xanthoma was observed [Oriented To Time, Place, And Person] : oriented to person, place, and time [Affect] : the affect was normal [Mood] : the mood was normal [No Anxiety] : not feeling anxious

## 2021-05-11 NOTE — REASON FOR VISIT
[Structural Heart and Valve Disease] : structural heart and valve disease [Follow-Up - Clinic] : a clinic follow-up of [Atrial Fibrillation] : atrial fibrillation [Coronary Artery Disease] : coronary artery disease [Hyperlipidemia] : hyperlipidemia [Peripheral Vascular Disease] : peripheral vascular disease [FreeTextEntry3] : Dr. Lagos [FreeTextEntry1] : I saw this 75-year-old man in followup consultation on 05/11/21\par He had coronary artery stenting done in 2006 with 5 stents. He had a popliteal bypass of that same year.\par He had atrial fibrillation ablation in 2010, and again in 2018.. He takes medication for his lipids and is asymptomatic and doing well.\par He had a nuclear stress test which showed NO ischemia\par He recently had a thoracic procedure which  enhanced his breathing\par He comes in for a routine f/u with no complaints\par His lipids are well controlled\par The 3-day monitor did show episodes of supraventricular tachycardia and so I will maintain the Eliquis.\par The echo showed moderate aortic stenosis, and we will repeat the echo in 1 year.

## 2021-08-05 ENCOUNTER — NON-APPOINTMENT (OUTPATIENT)
Age: 76
End: 2021-08-05

## 2021-11-02 ENCOUNTER — RX RENEWAL (OUTPATIENT)
Age: 76
End: 2021-11-02

## 2021-11-08 NOTE — DISCHARGE NOTE ADULT - FUNCTIONAL SCREEN CURRENT LEVEL: BATHING, MLM
Lillian Martinez  [address] [place] [address]        12/03/21      Dear Lillian,      Your health care provider wrote a referral for you to see the Gastroenterology Department.    Your care is important to us.  Please call [phone] to schedule your appointment.    Sincerely,        [facility]  [facility]  [address] [place] [address]  Dept Phone: [phone]     (0) independent

## 2022-06-07 ENCOUNTER — NON-APPOINTMENT (OUTPATIENT)
Age: 77
End: 2022-06-07

## 2022-06-07 ENCOUNTER — APPOINTMENT (OUTPATIENT)
Dept: CARDIOLOGY | Facility: CLINIC | Age: 77
End: 2022-06-07
Payer: MEDICARE

## 2022-06-07 VITALS
OXYGEN SATURATION: 99 % | SYSTOLIC BLOOD PRESSURE: 110 MMHG | DIASTOLIC BLOOD PRESSURE: 60 MMHG | HEART RATE: 63 BPM | WEIGHT: 163 LBS | HEIGHT: 68 IN | BODY MASS INDEX: 24.71 KG/M2 | TEMPERATURE: 97.3 F

## 2022-06-07 PROCEDURE — 93000 ELECTROCARDIOGRAM COMPLETE: CPT

## 2022-06-07 PROCEDURE — 99215 OFFICE O/P EST HI 40 MIN: CPT

## 2022-06-07 NOTE — REASON FOR VISIT
[Arrhythmia/ECG Abnorrmalities] : arrhythmia/ECG abnormalities [Structural Heart and Valve Disease] : structural heart and valve disease [Follow-Up - Clinic] : a clinic follow-up of [Atrial Fibrillation] : atrial fibrillation [Coronary Artery Disease] : coronary artery disease [Hyperlipidemia] : hyperlipidemia [Peripheral Vascular Disease] : peripheral vascular disease [FreeTextEntry3] : Dr. Lagos [FreeTextEntry1] : I saw this 76-year-old man in followup consultation on 06/07/22\par He had coronary artery stenting done in 2006 with 5 stents. He had a popliteal bypass of that same year.\par He had atrial fibrillation ablation in 2010, and again in 2018.. He takes medication for his lipids and is asymptomatic and doing well.\par He had a nuclear stress test which showed NO ischemia\par He recently had a thoracic procedure which  enhanced his breathing\par He comes in for a routine f/u with no complaints\par His lipids are well controlled\par The 3-day monitor did show episodes of supraventricular tachycardia and so I will maintain the Eliquis.\par The echo showed moderate aortic stenosis, and we will repeat the echo in 1 year.

## 2022-10-13 ENCOUNTER — APPOINTMENT (OUTPATIENT)
Dept: ULTRASOUND IMAGING | Facility: CLINIC | Age: 77
End: 2022-10-13

## 2022-10-13 PROCEDURE — 93971 EXTREMITY STUDY: CPT | Mod: LT

## 2022-11-02 ENCOUNTER — RX RENEWAL (OUTPATIENT)
Age: 77
End: 2022-11-02

## 2022-12-13 NOTE — ASSESSMENT
LM reminding pt of pending labs  
[FreeTextEntry1] : 73 year old male 11 months s/p Bronchoscopy, Right VATs, Robotic Assisted, Tracheobronchoplasty with Prolene Mesh on 8/14/18. \par \par He is doing well. Breathing and cough has improved. No recent illnesses or infections. No longer taking asthma medications. States his energy has improved and he is able to play baseball with his grandchildren. Follow-up here in 1 year for reassessment, sooner if symptoms arise. \par \par I have reviewed the patient's medical records and diagnostic images at the time of this office visit and have made the following recommendations\par Plan:\par 1) RTO in 1 year for follow-up

## 2023-02-27 ENCOUNTER — NON-APPOINTMENT (OUTPATIENT)
Age: 78
End: 2023-02-27

## 2023-02-28 ENCOUNTER — APPOINTMENT (OUTPATIENT)
Dept: PULMONOLOGY | Facility: CLINIC | Age: 78
End: 2023-02-28
Payer: MEDICARE

## 2023-02-28 VITALS
HEIGHT: 68 IN | SYSTOLIC BLOOD PRESSURE: 128 MMHG | TEMPERATURE: 97.1 F | WEIGHT: 162 LBS | RESPIRATION RATE: 17 BRPM | HEART RATE: 89 BPM | BODY MASS INDEX: 24.55 KG/M2 | DIASTOLIC BLOOD PRESSURE: 74 MMHG | OXYGEN SATURATION: 98 %

## 2023-02-28 PROCEDURE — 99204 OFFICE O/P NEW MOD 45 MIN: CPT | Mod: 25

## 2023-02-28 PROCEDURE — 94010 BREATHING CAPACITY TEST: CPT

## 2023-02-28 PROCEDURE — 94727 GAS DIL/WSHOT DETER LNG VOL: CPT

## 2023-02-28 PROCEDURE — 94729 DIFFUSING CAPACITY: CPT

## 2023-02-28 PROCEDURE — 94618 PULMONARY STRESS TESTING: CPT

## 2023-02-28 PROCEDURE — 71046 X-RAY EXAM CHEST 2 VIEWS: CPT

## 2023-02-28 PROCEDURE — 95012 NITRIC OXIDE EXP GAS DETER: CPT

## 2023-02-28 NOTE — PHYSICAL EXAM
[No Acute Distress] : no acute distress [Normal Oropharynx] : normal oropharynx [Normal Appearance] : normal appearance [No Neck Mass] : no neck mass [Normal Rate/Rhythm] : normal rate/rhythm [Normal S1, S2] : normal s1, s2 [No Resp Distress] : no resp distress [Clear to Auscultation Bilaterally] : clear to auscultation bilaterally [No Abnormalities] : no abnormalities [Benign] : benign [Normal Gait] : normal gait [No Clubbing] : no clubbing [No Cyanosis] : no cyanosis [No Edema] : no edema [FROM] : FROM [Normal Color/ Pigmentation] : normal color/ pigmentation [No Focal Deficits] : no focal deficits [Oriented x3] : oriented x3 [Normal Affect] : normal affect [III] : Mallampati Class: III [TextBox_54] : 2/6 systolic murmur [TextBox_68] : I:E ratio 1:3; expiratory wheezes b/l

## 2023-02-28 NOTE — REASON FOR VISIT
[Follow-Up] : a follow-up visit [FreeTextEntry1] : bronchomalacia, chronic cough, elevated IgE, LPRD, overweight, PND, snoring, SOB and tracheomalacia

## 2023-02-28 NOTE — PROCEDURE
[FreeTextEntry1] : CXR reveals a normal sized heart; evidence of right middle lobe posterior scarring seen best on lateral view\par \par Full PFT revealed normal flows, with a FEV1 of 2.66L, which is 99% of predicted with mild obstructive dysfunction at mid-low volumes, normal lung volumes, and a diffusion of 21.4, which is 132% of predicted, with a normal flow volume loop\par \par 6 minute walk test reveals a low saturation of 93% with no evidence of dyspnea or fatigue; walked 489 meters\par \par FENO was 26; a normal value being less than 25\par Fractional exhaled nitric oxide (FENO) is regarded as a simple, noninvasive method for assessing eosinophilic airway inflammation. Produced by a variety of cells within the lung, nitric oxide (NO) concentrations are generally low in healthy individuals. However, high concentrations of NO appear to be involved in nonspecific host defense mechanisms and chronic inflammatory diseases such as asthma. The American Thoracic Society (ATS) therefore has recommended using FENO to aid in the diagnosis and monitoring of eosinophilic airway inflammation and asthma, and for identifying steroid responsive individuals whose chronic respiratory symptoms may be caused by airway inflammation.

## 2023-02-28 NOTE — HISTORY OF PRESENT ILLNESS
[FreeTextEntry1] : Mr. Power is a 77 year old male with a history of bronchomalacia, chronic cough, elevated IgE, LPRD, overweight, PND, snoring, SOB and tracheomalacia presenting to the office today for a follow up visit. His chief complaint is \par \par -s/p influenza shot followed by PNA shot 2 weeks later\par -he notes he got sick after the PNA shot\par -he notes he had a 101 F, diarrhea, myalgias\par -he notes he went to [facility] after the illness and developed chest congestion 6 weeks ago\par -he notes chest pain due to cough\par -he notes vision is stable \par -he notes bowels are regular \par -he notes good quality of sleep \par -he notes intermittent snoring\par -he denies cough at night\par -he notes cough is most prominent after dinner if he is at rest\par -he notes globus sensation\par -he notes one episode of hemoptysis \par -he notes taking Zn, Mg supplements\par \par -he denies any headaches, nausea, emesis, chills, sweats, chest pressure, wheezing, palpitations, constipation, diarrhea, vertigo, dysphagia, heartburn, reflux, itchy eyes, itchy ears, leg swelling, leg pain, arthralgias, myalgias, or sour taste in the mouth.

## 2023-02-28 NOTE — ADDENDUM
[FreeTextEntry1] : Documented by EDER Lepe acting as a scribe for Dr. Jasiel Welsh on 02/28/2023 .\par \par All medical record entries made by the Scribe were at my, Dr. Jasiel Welsh's, direction and personally dictated by me on 02/28/2023. I have reviewed the chart and agree that the record accurately reflects my personal performance of the history, physical exam, assessment and plan. I have also personally directed, reviewed, and agree with the discharge instructions.

## 2023-03-14 ENCOUNTER — NON-APPOINTMENT (OUTPATIENT)
Age: 78
End: 2023-03-14

## 2023-03-23 ENCOUNTER — APPOINTMENT (OUTPATIENT)
Dept: PULMONOLOGY | Facility: CLINIC | Age: 78
End: 2023-03-23
Payer: MEDICARE

## 2023-03-23 VITALS
BODY MASS INDEX: 24.55 KG/M2 | DIASTOLIC BLOOD PRESSURE: 68 MMHG | WEIGHT: 162 LBS | TEMPERATURE: 97.1 F | HEART RATE: 69 BPM | HEIGHT: 68 IN | SYSTOLIC BLOOD PRESSURE: 112 MMHG | RESPIRATION RATE: 17 BRPM | OXYGEN SATURATION: 93 %

## 2023-03-23 DIAGNOSIS — J18.9 PNEUMONIA, UNSPECIFIED ORGANISM: ICD-10-CM

## 2023-03-23 PROCEDURE — 71046 X-RAY EXAM CHEST 2 VIEWS: CPT

## 2023-03-23 PROCEDURE — 99214 OFFICE O/P EST MOD 30 MIN: CPT | Mod: 25

## 2023-03-23 NOTE — ADDENDUM
[FreeTextEntry1] : Documented by Severino Avila acting as a scribe for Dr. Jasiel Welsh on 03/23/2023.\par \par All medical record entries made by the Scribe were at my, Dr. Jasiel Welsh's, direction and personally dictated by me on 03/23/2023. I have reviewed the chart and agree that the record accurately reflects my personal performance of the history, physical exam, assessment and plan. I have also personally directed, reviewed, and agree with the discharge instructions.

## 2023-03-23 NOTE — PROCEDURE
[FreeTextEntry1] : CXR revealed a normal sized heart; there was no evidence of infiltrate or effusion -- A normal appearing chest radiograph. Resolution of prior PNA

## 2023-03-23 NOTE — ASSESSMENT
[FreeTextEntry1] : Mr. Power is a 77 year old male with a history of peripheral vascular disease, A-fib s/p ablation, CAD, HLD, asthma, asbestos exposure, nonsmoker, TBM, s/p TBM surgery, BPH who presents for a pulmonary evaluation now w/ SOB c/w PNA in RML/ ?active asthma\par \par His shortness of breath is multifactorial due to:\par -out of shape\par -poor breathing mechanics\par -CAD\par -pulmonary diseases including asthma and (+) tracheomalacia\par \par His chronic cough is multifactorial, possibly due to:\par -post-nasal drip syndrome/allergies\par -asthma\par -PNA\par -LPR\par -bronchomalacia\par -eosinophilic bronchitis\par -sensory neuropathic cough\par \par problem 1: asthma- flair - ?active\par -s/p Prednisone 20 mg x 7 days, 10 mg x 7 days \par -add Xopenex 0.63 via nebulizer q6H prn -BID\par -continue Dulera 200 2 inhalations BID with Aerochamber Spacer\par -continue Tudorza 1 inhalation BID \par -continue Singulair 10mg QHS \par -Inhaler technique reviewed as well as oral hygiene techniques reviewed with patient. Avoidance of cold air, extremes of temperature, rescue inhaler should be used before exercise. Order of medication reviewed with patient. Recommended use of a cool mist humidifier in the bedroom. Instructed to gargle and spit after inhaler use.\par -Asthma is  believed to be caused by inherited (genetic) and environmental factor, but its exact cause is unknown. Asthma may be triggered by allergens, lung infections, or irritants in the air. Asthma triggers are different for each person \par \par Problem 1A: PNA (RML)\par -add Zithromax 500 mg x 7 days \par Chest X-Ray/CT revealed an infiltrate c/w pneumonia; this based on the patient's clinical scenario required additional therapy. Any change in patient's status will require hospitalization at the nearest hospital and a local ambulance will need to be called. Our group is on staff at [facility] and [facility] as consultants. The patient/family is instructed to notify our office with any change in condition. \par \par Problem 1B: TBM s/p tracheoplasty\par -f/p with Dr. Dent.\par -Tracheomalacia is usually acquired in adults and common causes include damage by tracheostomy or endotracheal intubation damaging the tracheal cartilage with increase risk with multiple intubations, prolonged intubation, and concurrent high dose steroid therapy; external chest wall trauma and surgery; chronic compression of the trachea by benign etiologies (eg, benign mediastinal goiter) or malignancy; relapsing polychondritis; or recurrent infection. Tracheomalacia can be asymptomatic, however signs or symptoms can develop as the severity of the airway narrowing progresses with major symptoms include dyspnea, cough, and sputum retention. Other symptoms include severe paroxysms of coughing, wheezing or stridor, barking cough and may be exacerbated by forced expiration, cough, and Valsalva maneuver. Tracheomalacia is diagnosed by a bronchoscopic visualization of dynamic airway collapse on dynamic chest CT. Therapy is warranted in symptomatic patients with severe tracheomalacia and includes surgical repair as tracheobronchoplasty. The patient was referred to Dr. Alexandro Hunt or Dr. Vicente Montesinos at [facility] for a surgical consult. \par \par problem 2: allergies/post-nasal drip syndrome\par -continue Clarinex 5 mg QHS \par -Environmental measures for allergies were encouraged including mattress and pillow cover, air purifier, and environmental controls. \par \par problem 3: LPR/GERD\par -recommended to control diet\par -Things to avoid including overeating, spicy foods, tight clothing, eating within three hours of bed, this list is not all inclusive. \par -For treatment of reflux, possible options discussed including diet control, H2 blockers, PPIs, as well as coating motility agents discussed as treatment options. Timing of meals and proximity of last meal to sleep were discussed. If symptoms persist, a formal gastrointestinal evaluation is needed.\par -Rule of 2's: Avoid eating too late, too fast, too much, too spicy or within two hours of bedtime \par \par problem 4: bronchomalacia \par -s/p bronchoscopy with Dr. Theo Solo on 5/15/18, now awaiting surgery\par -recommended acapella device to be used regularly \par \par problem 5: OSAS \par -recommended to complete a home sleep study \par -Sleep apnea is associated with adverse clinical consequences which an affect most organ systems.  Cardiovascular disease risk includes arrhythmias, atrial fibrillation, hypertension, coronary artery disease, and stroke. Metabolic disorders include diabetes type 2, non-alcoholic fatty liver disease. Mood disorder especially depression; and cognitive decline especially in the elderly. Associations with  chronic reflux/Saucedo’s esophagus some but not all inclusive. \par -Reasons  include arousal consistent with hypopnea; respiratory events most prominent in REM sleep or supine position; therefore sleep staging and body position are important for accurate diagnosis and estimation of AHI. \par \par problem 6: poor breathing mechanics\par -Proper breathing techniques were reviewed with an emphasis of exhalation. Patient instructed to breath in for 1 second and out for four seconds. Patient was encouraged to not talk while walking. \par \par problem 7: cardiac health\par -recommended to follow up with a cardiologist Dr. Frye and Dr. Simmons\par \par Problem 7A: ?Anemia\par -complete iron studies and BNP\par -Aside from OSAS other etiologies include thyroid disease, anemia, low testosterone levels and vitamin deficiencies as well as medication effects. Based on this recommended: CBC, thyroid function test, Vitamin D, levels, sleep study, and free and total testosterone levels. Dr. Welsh went over topic multiple times and discussed for an extensive period of time. \par \par problem 8: out of shape\par -Exercise and diet control were discussed and are highly encouraged. Treatment options were given such as, aqua therapy, and contacting a nutritionist. Recommended to use the elliptical, stationary bike, less use of treadmill. Mindful eating was explained to the patient. Obesity is associated with worsening asthma, shortness of breath, and potential for cardiac disease, diabetes, and other underlying medical conditions. \par \par problem 9: sensory neuropathic cough\par -continue Amitriptyline 10 mg QHS, up to TID\par Sensory neuropathic cough is an etiology of cough that is often realized once someone has been ruled out for common disease such as: asthma, COPD, eosinophilic bronchitis, bronchiectasis, post nasal drip, and GERD. It sometimes develops following a URI, herpes zoster outbreak in pharynx or thyroid or cervical spine injury. However, many patients have no identifiable antecedent explanation. \par \par problem 10: elevated IgE level\par -he is a candidate for Xolair injections, no need to implement at this point in time\par -Xolair is a recombinant DNA- derived humanized IgG1K monoclonal antibody that selectively binds to human immunoglobulin E (IgE). Xolair is produced by a Chinese hamster ovary cell suspension culture in nutrient medium containing the antibiotic gentamicin. Gentamicin is not detectable in the final product. Xolair is a sterile, white, preservative free, lyophilized powder contained in a single use vial that is reconstituted with sterile water for suspension. Side effects include: wheezing, tightness of the chest, trouble breathing, hives, skin rash, feeling anxious or light-headed, fainting, warmth or tingling under skin, or swelling of face, lips, or tongue \par \par problem 11: health maintenance \par -s/p influenza vaccination 2022\par -recommended strep pneumonia vaccines: Prevnar-13 vaccine, followed by Pneumo vaccine 23 one year following\par -recommended early intervention for URIs\par -recommended regular osteoporosis evaluations\par -recommended early dermatological evaluations\par -recommended after the age of 50 to the age of 70, colonoscopy every 5 years \par  \par \par Follow up in 2 months\par Patient is encouraged to call with any changes, concerns or questions.

## 2023-03-23 NOTE — PATIENT PROFILE ADULT. - MEDICATION HERBAL REMEDIES, PROFILE
Impression: Nexdtve age-related mclr degn, left eye, intermed dry stage: H35.3122. Plan: No sign of CNV development on exam or testing. call if visual changes occur.  cont AREDS supplements no

## 2023-03-23 NOTE — PHYSICAL EXAM
[No Acute Distress] : no acute distress [Normal Oropharynx] : normal oropharynx [III] : Mallampati Class: III [Normal Appearance] : normal appearance [No Neck Mass] : no neck mass [Normal Rate/Rhythm] : normal rate/rhythm [Normal S1, S2] : normal s1, s2 [No Resp Distress] : no resp distress [Clear to Auscultation Bilaterally] : clear to auscultation bilaterally [No Abnormalities] : no abnormalities [Benign] : benign [Normal Gait] : normal gait [No Clubbing] : no clubbing [No Cyanosis] : no cyanosis [No Edema] : no edema [FROM] : FROM [Normal Color/ Pigmentation] : normal color/ pigmentation [No Focal Deficits] : no focal deficits [Oriented x3] : oriented x3 [Normal Affect] : normal affect [TextBox_68] : I:E ratio 1:3; clear

## 2023-03-23 NOTE — HISTORY OF PRESENT ILLNESS
[FreeTextEntry1] : Mr. Power is a 77 year old male with a history of bronchomalacia, chronic cough, elevated IgE, LPRD, overweight, PND, snoring, SOB and tracheomalacia presenting to the office today for a follow up visit. His chief complaint is \par \par -he notes SOB once in a while\par -he notes he sometimes cannot take a full deep breath\par -he notes his energy levels are good \par -he notes diarrhea and fever due to PNA vaccine\par -he denies blood in urine and stool\par -he notes very small amounts of blood if h coughs very hard\par -he denies any visual changes\par -he notes his sense of smell and taste are normal \par -he notes hoarseness which he believes is due to inhalers\par -he notes his weight is stable\par -he notes a lump in his throat that needs to be cleared \par -he notes PND constantly \par -he notes exercising (walking) but less at the moment\par -no new medications, vitamins, or supplements\par -he notes taking Vitamin D, B, Zinc, CoQ-10, tumeric\par \par \par -patient denies any nausea, chills, sweats, chest pain, chest pressure, palpitations, coughing, fatigue, dysphagia, dizziness, leg swelling, leg pain, itchy eyes, itchy ears, heartburn, reflux or sour taste in the mouth

## 2023-03-31 ENCOUNTER — TRANSCRIPTION ENCOUNTER (OUTPATIENT)
Age: 78
End: 2023-03-31

## 2023-06-06 ENCOUNTER — NON-APPOINTMENT (OUTPATIENT)
Age: 78
End: 2023-06-06

## 2023-06-06 ENCOUNTER — APPOINTMENT (OUTPATIENT)
Dept: CARDIOLOGY | Facility: CLINIC | Age: 78
End: 2023-06-06
Payer: MEDICARE

## 2023-06-06 VITALS
OXYGEN SATURATION: 99 % | HEART RATE: 58 BPM | DIASTOLIC BLOOD PRESSURE: 70 MMHG | WEIGHT: 162 LBS | BODY MASS INDEX: 24.55 KG/M2 | HEIGHT: 68 IN | SYSTOLIC BLOOD PRESSURE: 136 MMHG

## 2023-06-06 DIAGNOSIS — Z00.00 ENCOUNTER FOR GENERAL ADULT MEDICAL EXAMINATION W/OUT ABNORMAL FINDINGS: ICD-10-CM

## 2023-06-06 PROCEDURE — 99215 OFFICE O/P EST HI 40 MIN: CPT

## 2023-06-06 PROCEDURE — 93000 ELECTROCARDIOGRAM COMPLETE: CPT

## 2023-06-06 RX ORDER — BLOOD-GLUCOSE METER
KIT MISCELLANEOUS
Qty: 1 | Refills: 0 | Status: DISCONTINUED | COMMUNITY
Start: 2023-03-01 | End: 2023-06-06

## 2023-06-06 RX ORDER — APIXABAN 5 MG/1
5 TABLET, FILM COATED ORAL
Qty: 180 | Refills: 3 | Status: DISCONTINUED | COMMUNITY
Start: 2017-07-18 | End: 2023-06-06

## 2023-06-06 RX ORDER — AZITHROMYCIN 250 MG/1
250 TABLET, FILM COATED ORAL
Qty: 6 | Refills: 0 | Status: DISCONTINUED | COMMUNITY
Start: 2020-12-09 | End: 2023-06-06

## 2023-06-06 RX ORDER — ACLIDINIUM BROMIDE 400 UG/1
400 POWDER, METERED RESPIRATORY (INHALATION) TWICE DAILY
Qty: 3 | Refills: 1 | Status: DISCONTINUED | COMMUNITY
Start: 2023-02-28 | End: 2023-06-06

## 2023-06-06 NOTE — REASON FOR VISIT
[Arrhythmia/ECG Abnorrmalities] : arrhythmia/ECG abnormalities [Structural Heart and Valve Disease] : structural heart and valve disease [Follow-Up - Clinic] : a clinic follow-up of [Atrial Fibrillation] : atrial fibrillation [Coronary Artery Disease] : coronary artery disease [Hyperlipidemia] : hyperlipidemia [Peripheral Vascular Disease] : peripheral vascular disease [FreeTextEntry3] : Dr. Lagos [FreeTextEntry1] : I saw this 77-year-old man in followup consultation on 06/06/23\par He had coronary artery stenting done in 2006 with 5 stents. He had a popliteal bypass of that same year.\par He had atrial fibrillation ablation in 2010, and again in 2018.. He takes medication for his lipids and is asymptomatic and doing well.\par He had a nuclear stress test which showed NO ischemia\par He recently had a thoracic procedure which  enhanced his breathing\par He comes in for a routine f/u with no complaints\par His lipids are well controlled\par The 3-day monitor did show episodes of supraventricular tachycardia and so I will maintain the Eliquis.\par The echo showed moderate aortic stenosis, and we will repeat the echo

## 2023-06-06 NOTE — PROGRESS NOTE ADULT - SUBJECTIVE AND OBJECTIVE BOX
Patient discussed on morning rounds with       Operation / Date:     SUBJECTIVE ASSESSMENT:  72y Male         Vital Signs Last 24 Hrs  T(C): 36.3 (16 Aug 2018 14:36), Max: 37.4 (15 Aug 2018 21:40)  T(F): 97.3 (16 Aug 2018 14:36), Max: 99.4 (15 Aug 2018 21:40)  HR: 96 (16 Aug 2018 09:03) (80 - 98)  BP: 137/69 (16 Aug 2018 09:03) (110/60 - 137/69)  BP(mean): 95 (16 Aug 2018 09:03) (74 - 95)  RR: 16 (16 Aug 2018 09:03) (14 - 18)  SpO2: 95% (16 Aug 2018 09:03) (95% - 97%)  I&O's Detail    15 Aug 2018 07:01  -  16 Aug 2018 07:00  --------------------------------------------------------  IN:    IV PiggyBack: 200 mL    lactated ringers.: 75 mL    Oral Fluid: 1180 mL  Total IN: 1455 mL    OUT:    Indwelling Catheter - Urethral: 105 mL    Voided: 1360 mL  Total OUT: 1465 mL    Total NET: -10 mL      16 Aug 2018 07:01  -  16 Aug 2018 15:44  --------------------------------------------------------  IN:    Oral Fluid: 480 mL  Total IN: 480 mL    OUT:  Total OUT: 0 mL    Total NET: 480 mL    CHEST TUBE:  No  ELVER DRAIN: No.  EPICARDIAL WIRES: No.  TIE DOWNS: Yes/  CORREA: Yes/No.    PHYSICAL EXAM:    General: Patient seen OOB in NAD  Neurological:  Alert and oriented x3, no gross deficits appreciated                   Cardiovascular: RRR, S1S2, no murmur  Respiratory:  equal breath sounds, no rales, no rhonchi, no wheeze  Gastrointestinal:  soft, nontender, positive bowel sounds  Extremities:  no edema  Vascular:    all pulses  intact (radial, femoral, DP/PT)  Incision Sites:    LABS:                        12.7   7.2   )-----------( 182      ( 16 Aug 2018 05:31 )             38.3     COUMADIN:  No. REASON: .    PT/INR - ( 16 Aug 2018 05:31 )   PT: 12.2 sec;   INR: 1.10          PTT - ( 16 Aug 2018 05:31 )  PTT:29.8 sec    08-16    137  |  101  |  12  ----------------------------<  113<H>  4.1   |  27  |  0.87    Ca    8.7      16 Aug 2018 05:31  Phos  4.8     08-15  Mg     1.9     08-16    TPro  6.3  /  Alb  3.6  /  TBili  0.5  /  DBili  x   /  AST  60<H>  /  ALT  59<H>  /  AlkPhos  46  08-15    MEDICATIONS  (STANDING):  ALBUTerol    90 MICROgram(s) HFA Inhaler 1 Puff(s) Inhalation every 4 hours  ALBUTerol/ipratropium for Nebulization 3 milliLiter(s) Nebulizer every 6 hours  aspirin enteric coated 81 milliGRAM(s) Oral daily  buDESOnide   0.25 milliGRAM(s) Respule 0.25 milliGRAM(s) Inhalation every 12 hours  BUpivacaine liposome 1.3% Injectable (no eMAR) 20 milliLiter(s) Local Injection once  docusate sodium 100 milliGRAM(s) Oral three times a day  heparin  Injectable 5000 Unit(s) SubCutaneous every 8 hours  metoprolol tartrate 12.5 milliGRAM(s) Oral every 12 hours  pantoprazole    Tablet 40 milliGRAM(s) Oral before breakfast  polyethylene glycol 3350 17 Gram(s) Oral daily  senna 2 Tablet(s) Oral at bedtime  simvastatin 20 milliGRAM(s) Oral at bedtime  sodium chloride 0.9% lock flush 3 milliLiter(s) IV Push every 8 hours  tiotropium 18 MICROgram(s) Capsule 1 Capsule(s) Inhalation daily    MEDICATIONS  (PRN):  acetaminophen   Tablet. 650 milliGRAM(s) Oral every 6 hours PRN Moderate Pain (4 - 6)  acetaminophen  IVPB. 1000 milliGRAM(s) IV Intermittent once PRN Moderate Pain (4 - 6)        RADIOLOGY & ADDITIONAL TESTS: Patient discussed on morning rounds with Dr. Solo      Operation / Date: 8/14/18 R VATS tracheobronchoplasty     SUBJECTIVE ASSESSMENT:  72year old male who was walking with his son in the hallway, requested he be discharged early tomorrow morning.  Denies pain, sob.     Vital Signs Last 24 Hrs  T(C): 36.3 (16 Aug 2018 14:36), Max: 37.4 (15 Aug 2018 21:40)  T(F): 97.3 (16 Aug 2018 14:36), Max: 99.4 (15 Aug 2018 21:40)  HR: 96 (16 Aug 2018 09:03) (80 - 98)  BP: 137/69 (16 Aug 2018 09:03) (110/60 - 137/69)  BP(mean): 95 (16 Aug 2018 09:03) (74 - 95)  RR: 16 (16 Aug 2018 09:03) (14 - 18)  SpO2: 95% (16 Aug 2018 09:03) (95% - 97%)  I&O's Detail    15 Aug 2018 07:01  -  16 Aug 2018 07:00  --------------------------------------------------------  IN:    IV PiggyBack: 200 mL    lactated ringers.: 75 mL    Oral Fluid: 1180 mL  Total IN: 1455 mL    OUT:    Indwelling Catheter - Urethral: 105 mL    Voided: 1360 mL  Total OUT: 1465 mL    Total NET: -10 mL      16 Aug 2018 07:01  -  16 Aug 2018 15:44  --------------------------------------------------------  IN:    Oral Fluid: 480 mL  Total IN: 480 mL    OUT:  Total OUT: 0 mL    Total NET: 480 mL    CHEST TUBE:  No  ELVER DRAIN: No.  EPICARDIAL WIRES: No.  TIE DOWNS: Yes/  CORREA: Yes/No.    PHYSICAL EXAM:    General: Patient seen OOB in NAD  Neurological:  Alert and oriented x3, no gross deficits appreciated                   Cardiovascular: RRR, S1S2, no murmur  Respiratory:  equal breath sounds, no rales, no rhonchi, no wheeze, anterior chest with mild subcutaneous air  Gastrointestinal:  soft, nontender, positive bowel sounds  Extremities:  no edema  Vascular:  all pulses  intact (radial, femoral, DP/PT)  Incision Sites: trocar sites R. lateral chest c/d/i with dermabond, chest tube site with dressing in place    LABS:                        12.7   7.2   )-----------( 182      ( 16 Aug 2018 05:31 )             38.3     COUMADIN:  No. REASON: .    PT/INR - ( 16 Aug 2018 05:31 )   PT: 12.2 sec;   INR: 1.10          PTT - ( 16 Aug 2018 05:31 )  PTT:29.8 sec    08-16    137  |  101  |  12  ----------------------------<  113<H>  4.1   |  27  |  0.87    Ca    8.7      16 Aug 2018 05:31  Phos  4.8     08-15  Mg     1.9     08-16    TPro  6.3  /  Alb  3.6  /  TBili  0.5  /  DBili  x   /  AST  60<H>  /  ALT  59<H>  /  AlkPhos  46  08-15    MEDICATIONS  (STANDING):  ALBUTerol    90 MICROgram(s) HFA Inhaler 1 Puff(s) Inhalation every 4 hours  ALBUTerol/ipratropium for Nebulization 3 milliLiter(s) Nebulizer every 6 hours  aspirin enteric coated 81 milliGRAM(s) Oral daily  buDESOnide   0.25 milliGRAM(s) Respule 0.25 milliGRAM(s) Inhalation every 12 hours  BUpivacaine liposome 1.3% Injectable (no eMAR) 20 milliLiter(s) Local Injection once  docusate sodium 100 milliGRAM(s) Oral three times a day  heparin  Injectable 5000 Unit(s) SubCutaneous every 8 hours  metoprolol tartrate 12.5 milliGRAM(s) Oral every 12 hours  pantoprazole    Tablet 40 milliGRAM(s) Oral before breakfast  polyethylene glycol 3350 17 Gram(s) Oral daily  senna 2 Tablet(s) Oral at bedtime  simvastatin 20 milliGRAM(s) Oral at bedtime  sodium chloride 0.9% lock flush 3 milliLiter(s) IV Push every 8 hours  tiotropium 18 MICROgram(s) Capsule 1 Capsule(s) Inhalation daily    MEDICATIONS  (PRN):  acetaminophen   Tablet. 650 milliGRAM(s) Oral every 6 hours PRN Moderate Pain (4 - 6)  acetaminophen  IVPB. 1000 milliGRAM(s) IV Intermittent once PRN Moderate Pain (4 - 6)      RADIOLOGY & ADDITIONAL TESTS: Patient discussed on morning rounds with Dr. Solo      Operation / Date: 8/14/18 R VATS tracheobronchoplasty     SUBJECTIVE ASSESSMENT:  72year old male who was walking with his son in the hallway reports his pain is better since getting the IV tylenol.  Denies sob.     Vital Signs Last 24 Hrs  T(C): 36.3 (16 Aug 2018 14:36), Max: 37.4 (15 Aug 2018 21:40)  T(F): 97.3 (16 Aug 2018 14:36), Max: 99.4 (15 Aug 2018 21:40)  HR: 96 (16 Aug 2018 09:03) (80 - 98)  BP: 137/69 (16 Aug 2018 09:03) (110/60 - 137/69)  BP(mean): 95 (16 Aug 2018 09:03) (74 - 95)  RR: 16 (16 Aug 2018 09:03) (14 - 18)  SpO2: 95% (16 Aug 2018 09:03) (95% - 97%)  I&O's Detail    15 Aug 2018 07:01  -  16 Aug 2018 07:00  --------------------------------------------------------  IN:    IV PiggyBack: 200 mL    lactated ringers.: 75 mL    Oral Fluid: 1180 mL  Total IN: 1455 mL    OUT:    Indwelling Catheter - Urethral: 105 mL    Voided: 1360 mL  Total OUT: 1465 mL    Total NET: -10 mL      16 Aug 2018 07:01  -  16 Aug 2018 15:44  --------------------------------------------------------  IN:    Oral Fluid: 480 mL  Total IN: 480 mL    OUT:  Total OUT: 0 mL    Total NET: 480 mL    CHEST TUBE:  No  ELVER DRAIN: No.  EPICARDIAL WIRES: No.  TIE DOWNS: Yes/  CORREA: Yes/No.    PHYSICAL EXAM:    General: Patient seen OOB in NAD  Neurological:  Alert and oriented x3, no gross deficits appreciated                   Cardiovascular: RRR, S1S2, no murmur  Respiratory:  equal breath sounds, no rales, no rhonchi, no wheeze, anterior chest with mild subcutaneous air  Gastrointestinal:  soft, nontender, positive bowel sounds  Extremities:  no edema  Vascular:  all pulses  intact (radial, femoral, DP/PT)  Incision Sites: trocar sites R. lateral chest c/d/i with dermabond, chest tube site with dressing in place    LABS:                        12.7   7.2   )-----------( 182      ( 16 Aug 2018 05:31 )             38.3     COUMADIN:  No. REASON: .    PT/INR - ( 16 Aug 2018 05:31 )   PT: 12.2 sec;   INR: 1.10          PTT - ( 16 Aug 2018 05:31 )  PTT:29.8 sec    08-16    137  |  101  |  12  ----------------------------<  113<H>  4.1   |  27  |  0.87    Ca    8.7      16 Aug 2018 05:31  Phos  4.8     08-15  Mg     1.9     08-16    TPro  6.3  /  Alb  3.6  /  TBili  0.5  /  DBili  x   /  AST  60<H>  /  ALT  59<H>  /  AlkPhos  46  08-15    MEDICATIONS  (STANDING):  ALBUTerol    90 MICROgram(s) HFA Inhaler 1 Puff(s) Inhalation every 4 hours  ALBUTerol/ipratropium for Nebulization 3 milliLiter(s) Nebulizer every 6 hours  aspirin enteric coated 81 milliGRAM(s) Oral daily  buDESOnide   0.25 milliGRAM(s) Respule 0.25 milliGRAM(s) Inhalation every 12 hours  BUpivacaine liposome 1.3% Injectable (no eMAR) 20 milliLiter(s) Local Injection once  docusate sodium 100 milliGRAM(s) Oral three times a day  heparin  Injectable 5000 Unit(s) SubCutaneous every 8 hours  metoprolol tartrate 12.5 milliGRAM(s) Oral every 12 hours  pantoprazole    Tablet 40 milliGRAM(s) Oral before breakfast  polyethylene glycol 3350 17 Gram(s) Oral daily  senna 2 Tablet(s) Oral at bedtime  simvastatin 20 milliGRAM(s) Oral at bedtime  sodium chloride 0.9% lock flush 3 milliLiter(s) IV Push every 8 hours  tiotropium 18 MICROgram(s) Capsule 1 Capsule(s) Inhalation daily    MEDICATIONS  (PRN):  acetaminophen   Tablet. 650 milliGRAM(s) Oral every 6 hours PRN Moderate Pain (4 - 6)  acetaminophen  IVPB. 1000 milliGRAM(s) IV Intermittent once PRN Moderate Pain (4 - 6)      RADIOLOGY & ADDITIONAL TESTS: All sensation in tact

## 2023-06-06 NOTE — DISCUSSION/SUMMARY
[FreeTextEntry1] : 1) He will remain on the Eliquis because of PAF\par 2) I will maintain the Beta blocker for now for PAF control\par 3) He will continue his other medications such as BP and lipid lowering\par 4) Echo to evaluate Aortic murmur showed moderate Aortic stenosis. Repeat this summer\par 5) F/u in 6 months\par

## 2023-06-20 ENCOUNTER — APPOINTMENT (OUTPATIENT)
Dept: PULMONOLOGY | Facility: CLINIC | Age: 78
End: 2023-06-20
Payer: MEDICARE

## 2023-06-20 ENCOUNTER — NON-APPOINTMENT (OUTPATIENT)
Age: 78
End: 2023-06-20

## 2023-06-20 VITALS
RESPIRATION RATE: 16 BRPM | OXYGEN SATURATION: 98 % | DIASTOLIC BLOOD PRESSURE: 60 MMHG | HEIGHT: 68 IN | BODY MASS INDEX: 24.55 KG/M2 | WEIGHT: 162 LBS | SYSTOLIC BLOOD PRESSURE: 126 MMHG | HEART RATE: 57 BPM | TEMPERATURE: 96.8 F

## 2023-06-20 DIAGNOSIS — J45.909 UNSPECIFIED ASTHMA, UNCOMPLICATED: ICD-10-CM

## 2023-06-20 PROCEDURE — 95012 NITRIC OXIDE EXP GAS DETER: CPT

## 2023-06-20 PROCEDURE — 99214 OFFICE O/P EST MOD 30 MIN: CPT | Mod: 25

## 2023-06-20 PROCEDURE — 94010 BREATHING CAPACITY TEST: CPT

## 2023-06-20 NOTE — PHYSICAL EXAM
[No Acute Distress] : no acute distress [Normal Oropharynx] : normal oropharynx [III] : Mallampati Class: III [Normal Appearance] : normal appearance [No Neck Mass] : no neck mass [Normal Rate/Rhythm] : normal rate/rhythm [Normal S1, S2] : normal s1, s2 [No Resp Distress] : no resp distress [Clear to Auscultation Bilaterally] : clear to auscultation bilaterally [No Abnormalities] : no abnormalities [Benign] : benign [Normal Gait] : normal gait [No Clubbing] : no clubbing [No Cyanosis] : no cyanosis [FROM] : FROM [Normal Color/ Pigmentation] : normal color/ pigmentation [No Focal Deficits] : no focal deficits [Oriented x3] : oriented x3 [Normal Affect] : normal affect [TextBox_54] : 2/6 systolic murmur [TextBox_68] : I:E ratio 1:3; clear [TextBox_105] : trace pedal edema

## 2023-06-20 NOTE — ADDENDUM
[FreeTextEntry1] : Documented by Indira Morgan acting as a scribe for Dr. Jasiel Welsh on 06/20/2023 \par \par All medical record entries made by the Scribe were at my, Dr. Jasiel Welsh's, direction and personally dictated by me on 06/20/2023 . I have reviewed the chart and agree that the record accurately reflects my personal performance of the history, physical exam, assessment and plan. I have also personally directed, reviewed, and agree with the discharge instructions.

## 2023-06-20 NOTE — HISTORY OF PRESENT ILLNESS
[FreeTextEntry1] : Mr. Power is a 77 year old male with a history of bronchomalacia, chronic cough, elevated IgE, LPRD, overweight, PND, snoring, SOB and tracheomalacia presenting to the office today for a follow up visit. His chief complaint is \par \par - he notes feeling generally well\par - he notes a lot of post nasal drip\par - he notes some chest and back pain\par - he notes having had hoarseness, since resolved\par - he notes some ankle swelling, not worrisome due to procedures\par - sense of smell and taste are okay\par - he notes SOB during deep breaths\par - weight is stable\par - he notes walking 1.5 miles 3-4 times per week for exercise\par - vision is stable\par - he notes his energy levels are at a 10/10\par - appetite is good\par - he notes his balance is okay\par - he notes staying in during the smoky climate\par - he notes using Mometasone nasal spray he got from his ENT\par - he notes rhinitis in his right nostril and congestion in his left \par \par -He denies any visual issues, headaches, nausea, vomiting, fever, chills, sweats, chest pains, chest pressure, diarrhea, constipation, dysphagia, myalgia, dizziness, leg swelling, leg pain, itchy eyes, itchy ears, heartburn, reflux, or sour taste in the mouth.

## 2023-06-20 NOTE — ASSESSMENT
[FreeTextEntry1] : Mr. Power is a 77 year old male with a history of peripheral vascular disease, A-fib s/p ablation, CAD, HLD, asthma, asbestos exposure, nonsmoker, TBM, s/p TBM surgery, BPH who presents for a pulmonary evaluation s/p SOB c/w PNA in RML/ ?active asthma 3/2023 - now w/ PND #1\par \par His shortness of breath is multifactorial due to:\par -out of shape\par -poor breathing mechanics\par -CAD\par -pulmonary diseases including asthma and (+) tracheomalacia\par \par His chronic cough is multifactorial, possibly due to:\par -post-nasal drip syndrome/allergies\par -asthma\par -PNA\par -LPR\par -bronchomalacia\par -eosinophilic bronchitis\par -sensory neuropathic cough\par \par problem 1: asthma- controlled\par -s/p Prednisone 20 mg x 7 days, 10 mg x 7 days \par -add Xopenex 0.63 via nebulizer q6H prn -BID\par -continue Dulera 200 2 inhalations BID with Aerochamber Spacer\par -continue Tudorza 1 inhalation BID \par -continue Singulair 10mg QHS \par -Inhaler technique reviewed as well as oral hygiene techniques reviewed with patient. Avoidance of cold air, extremes of temperature, rescue inhaler should be used before exercise. Order of medication reviewed with patient. Recommended use of a cool mist humidifier in the bedroom. Instructed to gargle and spit after inhaler use.\par -Asthma is  believed to be caused by inherited (genetic) and environmental factor, but its exact cause is unknown. Asthma may be triggered by allergens, lung infections, or irritants in the air. Asthma triggers are different for each person \par \par Problem 1A: PNA (RML) - resolved 3/2023\par - recheck PNA titers\par -add Zithromax 500 mg x 7 days \par Chest X-Ray/CT revealed an infiltrate c/w pneumonia; this based on the patient's clinical scenario required additional therapy. Any change in patient's status will require hospitalization at the nearest hospital and a local ambulance will need to be called. Our group is on staff at [facility] and [facility] as consultants. The patient/family is instructed to notify our office with any change in condition. \par \par Problem 1B: TBM s/p tracheoplasty\par -f/p with Dr. Dent.\par -Tracheomalacia is usually acquired in adults and common causes include damage by tracheostomy or endotracheal intubation damaging the tracheal cartilage with increase risk with multiple intubations, prolonged intubation, and concurrent high dose steroid therapy; external chest wall trauma and surgery; chronic compression of the trachea by benign etiologies (eg, benign mediastinal goiter) or malignancy; relapsing polychondritis; or recurrent infection. Tracheomalacia can be asymptomatic, however signs or symptoms can develop as the severity of the airway narrowing progresses with major symptoms include dyspnea, cough, and sputum retention. Other symptoms include severe paroxysms of coughing, wheezing or stridor, barking cough and may be exacerbated by forced expiration, cough, and Valsalva maneuver. Tracheomalacia is diagnosed by a bronchoscopic visualization of dynamic airway collapse on dynamic chest CT. Therapy is warranted in symptomatic patients with severe tracheomalacia and includes surgical repair as tracheobronchoplasty. The patient was referred to Dr. Alexandro Hunt or Dr. Vicente Montesinos at [facility] for a surgical consult. \par \par problem 2: allergies/post-nasal drip syndrome (active)\par -add Dymista 1 sniff BID \par -continue Clarinex 5 mg QHS \par -Environmental measures for allergies were encouraged including mattress and pillow cover, air purifier, and environmental controls. \par \par problem 3: LPR/GERD\par -recommended to control diet\par -Things to avoid including overeating, spicy foods, tight clothing, eating within three hours of bed, this list is not all inclusive. \par -For treatment of reflux, possible options discussed including diet control, H2 blockers, PPIs, as well as coating motility agents discussed as treatment options. Timing of meals and proximity of last meal to sleep were discussed. If symptoms persist, a formal gastrointestinal evaluation is needed.\par -Rule of 2's: Avoid eating too late, too fast, too much, too spicy or within two hours of bedtime \par \par problem 4: bronchomalacia \par -s/p bronchoscopy with Dr. Theo Solo on 5/15/18, now awaiting surgery\par -recommended acapella device to be used regularly \par \par problem 5: OSAS \par -recommended to complete a home sleep study \par -Sleep apnea is associated with adverse clinical consequences which an affect most organ systems.  Cardiovascular disease risk includes arrhythmias, atrial fibrillation, hypertension, coronary artery disease, and stroke. Metabolic disorders include diabetes type 2, non-alcoholic fatty liver disease. Mood disorder especially depression; and cognitive decline especially in the elderly. Associations with  chronic reflux/Saucedo’s esophagus some but not all inclusive. \par -Reasons  include arousal consistent with hypopnea; respiratory events most prominent in REM sleep or supine position; therefore sleep staging and body position are important for accurate diagnosis and estimation of AHI. \par \par problem 6: poor breathing mechanics\par -Proper breathing techniques were reviewed with an emphasis of exhalation. Patient instructed to breath in for 1 second and out for four seconds. Patient was encouraged to not talk while walking. \par \par problem 7: cardiac health\par -recommended to follow up with a cardiologist Dr. Frye and Dr. Simmons\par \par Problem 7A: ?Anemia\par -complete iron studies and BNP prn\par -Aside from OSAS other etiologies include thyroid disease, anemia, low testosterone levels and vitamin deficiencies as well as medication effects. Based on this recommended: CBC, thyroid function test, Vitamin D, levels, sleep study, and free and total testosterone levels. Dr. Welsh went over topic multiple times and discussed for an extensive period of time. \par \par problem 8: out of shape\par -Exercise and diet control were discussed and are highly encouraged. Treatment options were given such as, aqua therapy, and contacting a nutritionist. Recommended to use the elliptical, stationary bike, less use of treadmill. Mindful eating was explained to the patient. Obesity is associated with worsening asthma, shortness of breath, and potential for cardiac disease, diabetes, and other underlying medical conditions. \par \par problem 9: sensory neuropathic cough\par -continue Amitriptyline 10 mg QHS, up to TID\par Sensory neuropathic cough is an etiology of cough that is often realized once someone has been ruled out for common disease such as: asthma, COPD, eosinophilic bronchitis, bronchiectasis, post nasal drip, and GERD. It sometimes develops following a URI, herpes zoster outbreak in pharynx or thyroid or cervical spine injury. However, many patients have no identifiable antecedent explanation. \par \par problem 10: elevated IgE level\par -he is a candidate for Xolair injections, no need to implement at this point in time\par -Xolair is a recombinant DNA- derived humanized IgG1K monoclonal antibody that selectively binds to human immunoglobulin E (IgE). Xolair is produced by a Chinese hamster ovary cell suspension culture in nutrient medium containing the antibiotic gentamicin. Gentamicin is not detectable in the final product. Xolair is a sterile, white, preservative free, lyophilized powder contained in a single use vial that is reconstituted with sterile water for suspension. Side effects include: wheezing, tightness of the chest, trouble breathing, hives, skin rash, feeling anxious or light-headed, fainting, warmth or tingling under skin, or swelling of face, lips, or tongue \par \par problem 11: health maintenance \par -s/p influenza vaccination 2022\par -recommended strep pneumonia vaccines: Prevnar-13 vaccine, followed by Pneumo vaccine 23 one year following (completed)\par -recommended early intervention for URIs\par -recommended regular osteoporosis evaluations\par -recommended early dermatological evaluations\par -recommended after the age of 50 to the age of 70, colonoscopy every 5 years \par  \par \par Follow up in 2 months\par Patient is encouraged to call with any changes, concerns or questions.

## 2023-06-20 NOTE — PROCEDURE
[FreeTextEntry1] : \par PFT reveals normal flows, with an FEV1 of  2.89 L, which is  109% of predicted, with normal flow volume loop; PFT's were performed for the evaluation of Asthma\par \par FENO was 55; normal value being less than 25\par Fractional exhaled nitric oxide (FENO) is regarded as a simple, noninvasive method for assessing eosinophilic airway inflammation. Produced by a variety of cells within the lung, nitric oxide (NO) concentrations are generally low in healthy individuals. However, high concentrations of NO appear to be involved in nonspecific host defense mechanisms and chronic inflammatory diseases such as asthma. The American Thoracic Society (ATS) therefore has recommended using FENO to aid in the diagnosis and monitoring of eosinophilic airway inflammation and asthma, and for identifying steroid responsive individuals whose chronic respiratory symptoms may be airway inflammation.

## 2023-06-30 ENCOUNTER — APPOINTMENT (OUTPATIENT)
Dept: CARDIOLOGY | Facility: CLINIC | Age: 78
End: 2023-06-30
Payer: MEDICARE

## 2023-06-30 PROCEDURE — 93306 TTE W/DOPPLER COMPLETE: CPT

## 2023-08-15 ENCOUNTER — RESULT CHARGE (OUTPATIENT)
Age: 78
End: 2023-08-15

## 2023-08-16 ENCOUNTER — APPOINTMENT (OUTPATIENT)
Dept: CARDIOLOGY | Facility: CLINIC | Age: 78
End: 2023-08-16
Payer: MEDICARE

## 2023-08-16 VITALS
WEIGHT: 168 LBS | SYSTOLIC BLOOD PRESSURE: 144 MMHG | BODY MASS INDEX: 25.46 KG/M2 | HEART RATE: 59 BPM | DIASTOLIC BLOOD PRESSURE: 74 MMHG | OXYGEN SATURATION: 100 % | HEIGHT: 68 IN

## 2023-08-16 VITALS
HEART RATE: 65 BPM | OXYGEN SATURATION: 97 % | DIASTOLIC BLOOD PRESSURE: 82 MMHG | WEIGHT: 164 LBS | RESPIRATION RATE: 14 BRPM | BODY MASS INDEX: 24.86 KG/M2 | HEIGHT: 68 IN | SYSTOLIC BLOOD PRESSURE: 156 MMHG

## 2023-08-16 PROCEDURE — 93000 ELECTROCARDIOGRAM COMPLETE: CPT

## 2023-08-16 PROCEDURE — 99213 OFFICE O/P EST LOW 20 MIN: CPT

## 2023-08-16 PROCEDURE — 99205 OFFICE O/P NEW HI 60 MIN: CPT

## 2023-08-16 RX ORDER — DESLORATADINE 5 MG/1
5 TABLET ORAL DAILY
Qty: 90 | Refills: 1 | Status: DISCONTINUED | COMMUNITY
Start: 2023-06-20 | End: 2023-08-16

## 2023-08-17 NOTE — DISCUSSION/SUMMARY
[FreeTextEntry1] : JOANNE FERNÁNDEZ is a 77 year old M who presents today Aug 17, 2023 with the above history and the following active issues: Presurgical clearance/ Pt instructed to hold Eliquis for 48 hours prior to procedure and maintain ASA 81 mg QD. Restarting Eliquis will be at the discretion of the ENT surgeon , however should be done as soon as possible.

## 2023-08-17 NOTE — REVIEW OF SYSTEMS
[Skin Lesions] : skin lesion(s): [Negative] : Heme/Lymph [de-identified] : Had a basal cell CA removed from his forehead

## 2023-08-17 NOTE — HISTORY OF PRESENT ILLNESS
[FreeTextEntry1] : I saw this 77-year-old man for surgical clearance. He  He had coronary artery stenting done in 2006 with 5 stents. He had a popliteal bypass of that same year. He had atrial fibrillation ablation in 2010, and again in 2018.. He takes medication for his lipids and is asymptomatic and doing well. He had a nuclear stress test which showed NO ischemia He recently had a thoracic procedure which  enhanced his breathing He comes in for a routine f/u with no complaints His lipids are well controlled The 3-day monitor did show episodes of supraventricular tachycardia and so I will maintain the Eliquis. The echo showed moderate aortic stenosis, and we will repeat the echo

## 2023-10-05 ENCOUNTER — RX RENEWAL (OUTPATIENT)
Age: 78
End: 2023-10-05

## 2023-10-09 RX ORDER — METOPROLOL SUCCINATE 25 MG/1
25 TABLET, EXTENDED RELEASE ORAL
Qty: 90 | Refills: 3 | Status: ACTIVE | COMMUNITY
Start: 2018-09-13 | End: 1900-01-01

## 2023-10-31 ENCOUNTER — APPOINTMENT (OUTPATIENT)
Dept: PULMONOLOGY | Facility: CLINIC | Age: 78
End: 2023-10-31
Payer: MEDICARE

## 2023-10-31 ENCOUNTER — APPOINTMENT (OUTPATIENT)
Dept: CARDIOLOGY | Facility: CLINIC | Age: 78
End: 2023-10-31
Payer: MEDICARE

## 2023-10-31 VITALS
TEMPERATURE: 97.4 F | RESPIRATION RATE: 16 BRPM | HEIGHT: 68 IN | BODY MASS INDEX: 24.55 KG/M2 | WEIGHT: 162 LBS | OXYGEN SATURATION: 99 % | SYSTOLIC BLOOD PRESSURE: 138 MMHG | HEART RATE: 67 BPM | DIASTOLIC BLOOD PRESSURE: 80 MMHG

## 2023-10-31 VITALS
HEIGHT: 68 IN | BODY MASS INDEX: 23.95 KG/M2 | DIASTOLIC BLOOD PRESSURE: 66 MMHG | OXYGEN SATURATION: 97 % | SYSTOLIC BLOOD PRESSURE: 142 MMHG | HEART RATE: 68 BPM | WEIGHT: 158 LBS

## 2023-10-31 DIAGNOSIS — R09.82 POSTNASAL DRIP: ICD-10-CM

## 2023-10-31 PROCEDURE — 99215 OFFICE O/P EST HI 40 MIN: CPT

## 2023-10-31 PROCEDURE — ZZZZZ: CPT

## 2023-10-31 PROCEDURE — 99214 OFFICE O/P EST MOD 30 MIN: CPT | Mod: 25

## 2023-10-31 PROCEDURE — 94010 BREATHING CAPACITY TEST: CPT

## 2023-10-31 PROCEDURE — 95012 NITRIC OXIDE EXP GAS DETER: CPT

## 2023-12-05 RX ORDER — APIXABAN 5 MG/1
5 TABLET, FILM COATED ORAL
Qty: 180 | Refills: 3 | Status: ACTIVE | COMMUNITY
Start: 2021-12-27 | End: 1900-01-01

## 2024-03-02 LAB
FLUAV H1 2009 PAND RNA SPEC QL NAA+PROBE: DETECTED
RAPID RVP RESULT: DETECTED
SARS-COV-2 RNA PNL RESP NAA+PROBE: NOT DETECTED

## 2024-03-02 RX ORDER — OSELTAMIVIR PHOSPHATE 75 MG/1
75 CAPSULE ORAL
Qty: 10 | Refills: 0 | Status: ACTIVE | COMMUNITY
Start: 2024-03-02 | End: 1900-01-01

## 2024-03-04 ENCOUNTER — APPOINTMENT (OUTPATIENT)
Dept: PULMONOLOGY | Facility: CLINIC | Age: 79
End: 2024-03-04

## 2024-03-07 ENCOUNTER — APPOINTMENT (OUTPATIENT)
Dept: PULMONOLOGY | Facility: CLINIC | Age: 79
End: 2024-03-07
Payer: MEDICARE

## 2024-03-07 ENCOUNTER — APPOINTMENT (OUTPATIENT)
Dept: RADIOLOGY | Facility: CLINIC | Age: 79
End: 2024-03-07
Payer: MEDICARE

## 2024-03-07 DIAGNOSIS — J30.9 ALLERGIC RHINITIS, UNSPECIFIED: ICD-10-CM

## 2024-03-07 PROCEDURE — 71046 X-RAY EXAM CHEST 2 VIEWS: CPT

## 2024-03-07 PROCEDURE — 99214 OFFICE O/P EST MOD 30 MIN: CPT | Mod: 25

## 2024-03-07 RX ORDER — AZITHROMYCIN 500 MG/1
500 TABLET, FILM COATED ORAL DAILY
Qty: 7 | Refills: 0 | Status: ACTIVE | COMMUNITY
Start: 2023-02-28 | End: 1900-01-01

## 2024-03-07 RX ORDER — AZELASTINE HYDROCHLORIDE AND FLUTICASONE PROPIONATE 137; 50 UG/1; UG/1
137-50 SPRAY, METERED NASAL
Qty: 3 | Refills: 1 | Status: ACTIVE | COMMUNITY
Start: 2023-06-20 | End: 1900-01-01

## 2024-03-07 RX ORDER — FLUTICASONE PROPIONATE AND SALMETEROL 250; 50 UG/1; UG/1
250-50 POWDER RESPIRATORY (INHALATION)
Qty: 3 | Refills: 1 | Status: ACTIVE | COMMUNITY
Start: 2023-02-28 | End: 1900-01-01

## 2024-03-07 RX ORDER — MONTELUKAST 10 MG/1
10 TABLET, FILM COATED ORAL
Qty: 90 | Refills: 1 | Status: ACTIVE | COMMUNITY
Start: 2018-10-27 | End: 1900-01-01

## 2024-03-07 RX ORDER — PREDNISONE 10 MG/1
10 TABLET ORAL
Qty: 50 | Refills: 0 | Status: ACTIVE | COMMUNITY
Start: 2023-02-28 | End: 1900-01-01

## 2024-03-07 RX ORDER — LEVALBUTEROL HYDROCHLORIDE 0.63 MG/3ML
0.63 SOLUTION RESPIRATORY (INHALATION)
Qty: 120 | Refills: 3 | Status: ACTIVE | COMMUNITY
Start: 2023-02-28 | End: 1900-01-01

## 2024-03-07 NOTE — HISTORY OF PRESENT ILLNESS
[TextBox_4] : Mr. Power is a 78 year old male with a history of bronchomalacia, chronic cough, elevated IgE, LPRD, overweight, PND, snoring, SOB and tracheomalacia presenting to the office today for an acute visit. His chief complaint is: -reports he was diagnosed with the flu 1 week ago -reports he was prescribed Tamiflu -reports he has SOB, wheezing at night -reports coughing with thick green sputum -reports feeling fatigued  -reports he has been taking Mucinex with mild relief -reports he had pneumonia in the past and is concerned he may have it again -reports he would like to get a CXR   denies fever/chills, nausea, emesis, diarrhea, chest pain, headaches, dizziness, palpitations

## 2024-03-07 NOTE — REASON FOR VISIT
[Cough] : cough [Acute] : an acute visit [Shortness of Breath] : shortness of breath [Wheezing] : wheezing [Spouse] : spouse [TextBox_44] : JOANNE FERNÁNDEZ is being seen today for an acute visit for SOB, wheezing, cough

## 2024-03-07 NOTE — ASSESSMENT
[FreeTextEntry1] : Mr. Power is a 78 year old male with a history of peripheral vascular disease, A-fib s/p ablation, CAD, HLD, asthma, asbestos exposure, nonsmoker, TBM, s/p TBM surgery, BPH who presents for a pulmonary evaluation s/p SOB c/w PNA in RML/ ?active asthma 3/2023 - now s/p nasal polyp surgery 10/2023 (successful). He is here today for an acute visit for SOB, wheezing and cough.  problem 1: asthma- controlled -add Prednisone 20 mg x 7 days, 10 mg x 7 days (2022) -add Xopenex 0.63 via nebulizer q6H prn -BID -continue Dulera 200 2 inhalations BID with Aerochamber Spacer -continue Tudorza 1 inhalation BID -continue Singulair 10mg QHS -Inhaler technique reviewed as well as oral hygiene techniques reviewed with patient. Avoidance of cold air, extremes of temperature, rescue inhaler should be used before exercise. Order of medication reviewed with patient. Recommended use of a cool mist humidifier in the bedroom. Instructed to gargle and spit after inhaler use. -Asthma is believed to be caused by inherited (genetic) and environmental factor, but its exact cause is unknown. Asthma may be triggered by allergens, lung infections, or irritants in the air. Asthma triggers are different for each person  Problem 1A: PNA (RML) - resolved 3/2023 - recheck PNA titers -add Zithromax 500 mg x 7 days -rx for CXR done- patient will go to imaging center near his house Chest X-Ray/CT revealed an infiltrate c/w pneumonia; this based on the patient's clinical scenario required additional therapy. Any change in patient's status will require hospitalization at the nearest hospital and a local ambulance will need to be called. Our group is on staff at [facility] and [facility] as consultants. The patient/family is instructed to notify our office with any change in condition.  Problem 1B: TBM s/p tracheoplasty -f/p with Dr. Lamas et al. -Tracheomalacia is usually acquired in adults and common causes include damage by tracheostomy or endotracheal intubation damaging the tracheal cartilage with increase risk with multiple intubations, prolonged intubation, and concurrent high dose steroid therapy; external chest wall trauma and surgery; chronic compression of the trachea by benign etiologies (eg, benign mediastinal goiter) or malignancy; relapsing polychondritis; or recurrent infection. Tracheomalacia can be asymptomatic, however signs or symptoms can develop as the severity of the airway narrowing progresses with major symptoms include dyspnea, cough, and sputum retention. Other symptoms include severe paroxysms of coughing, wheezing or stridor, barking cough and may be exacerbated by forced expiration, cough, and Valsalva maneuver. Tracheomalacia is diagnosed by a bronchoscopic visualization of dynamic airway collapse on dynamic chest CT. Therapy is warranted in symptomatic patients with severe tracheomalacia and includes surgical repair as tracheobronchoplasty. The patient was referred to Dr. Alexandro Hunt or Dr. Vicente Montesinos at [facility] for a surgical consult.  problem 2: allergies/post-nasal drip syndrome (s/p polyp surgery 10/2023) -continue Clarinex 5 mg QHS -Environmental measures for allergies were encouraged including mattress and pillow cover, air purifier, and environmental controls.  problem 3: cardiac health -recommended to follow up with a cardiologist Dr. Frye and Dr. Simmons    Follow up with  Patient is encouraged to call with any changes, concerns or questions.

## 2024-03-13 ENCOUNTER — APPOINTMENT (OUTPATIENT)
Dept: PULMONOLOGY | Facility: CLINIC | Age: 79
End: 2024-03-13
Payer: MEDICARE

## 2024-03-13 VITALS
HEART RATE: 83 BPM | BODY MASS INDEX: 23.95 KG/M2 | SYSTOLIC BLOOD PRESSURE: 120 MMHG | RESPIRATION RATE: 16 BRPM | TEMPERATURE: 97.2 F | OXYGEN SATURATION: 98 % | DIASTOLIC BLOOD PRESSURE: 60 MMHG | HEIGHT: 68 IN | WEIGHT: 158 LBS

## 2024-03-13 DIAGNOSIS — J98.09 OTHER DISEASES OF BRONCHUS, NOT ELSEWHERE CLASSIFIED: ICD-10-CM

## 2024-03-13 DIAGNOSIS — R06.02 SHORTNESS OF BREATH: ICD-10-CM

## 2024-03-13 DIAGNOSIS — J39.8 OTHER SPECIFIED DISEASES OF UPPER RESPIRATORY TRACT: ICD-10-CM

## 2024-03-13 DIAGNOSIS — E66.3 OVERWEIGHT: ICD-10-CM

## 2024-03-13 DIAGNOSIS — R06.83 SNORING: ICD-10-CM

## 2024-03-13 DIAGNOSIS — J09.X2 INFLUENZA DUE TO IDENTIFIED NOVEL INFLUENZA A VIRUS WITH OTHER RESPIRATORY MANIFESTATIONS: ICD-10-CM

## 2024-03-13 DIAGNOSIS — K21.9 GASTRO-ESOPHAGEAL REFLUX DISEASE W/OUT ESOPHAGITIS: ICD-10-CM

## 2024-03-13 PROCEDURE — 95012 NITRIC OXIDE EXP GAS DETER: CPT

## 2024-03-13 PROCEDURE — 99214 OFFICE O/P EST MOD 30 MIN: CPT | Mod: 25

## 2024-03-13 PROCEDURE — 94010 BREATHING CAPACITY TEST: CPT

## 2024-03-13 NOTE — PROCEDURE
[FreeTextEntry1] : PFT reveals normal flows; FEV1 was 2.48 L which is 95% of predicted with a normal flow volume loop. PFT's for performed to evaluate for SOB.   FENO was 21; a normal value being less than 25Fractional exhaled nitric oxide (FENO) is regarded as a simple, noninvasive method for assessing eosinophilic airway inflammation. Produced by a variety of cells within the lung, nitric oxide (NO) concentrations are generally low in healthy individuals. However, high concentrations of NO appear to be involved in nonspecific host defense mechanisms and chronic inflammatory diseases such as asthma. The American Thoracic Society (ATS) therefore has strongly recommended using FENO to aid in the assessment, management, and long-term monitoring of eosinophilic airway inflammation and asthma, and for identifying steroid responsive individuals whose chronic respiratory symptoms may be caused by airway inflammation. In their 2011 clinical practice guideline, the ATS emphasizes the importance of using FENO.

## 2024-03-13 NOTE — PHYSICAL EXAM
[No Acute Distress] : no acute distress [Normal Oropharynx] : normal oropharynx [No Neck Mass] : no neck mass [Normal Appearance] : normal appearance [Normal Rate/Rhythm] : normal rate/rhythm [Normal S1, S2] : normal s1, s2 [No Resp Distress] : no resp distress [No Abnormalities] : no abnormalities [Clear to Auscultation Bilaterally] : clear to auscultation bilaterally [Benign] : benign [Normal Gait] : normal gait [No Clubbing] : no clubbing [No Cyanosis] : no cyanosis [Normal Color/ Pigmentation] : normal color/ pigmentation [FROM] : FROM [Oriented x3] : oriented x3 [No Focal Deficits] : no focal deficits [Normal Affect] : normal affect [III] : Mallampati Class: III [TextBox_68] : I:E ratio 1:3; mild expiratory wheezes

## 2024-03-13 NOTE — ADDENDUM
[FreeTextEntry1] :  Documented by Karin Lujan acting as a scribe for Dr. Jasiel Welsh on 03/13/2024 .  All medical record entries made by the Scribe were at my, Dr. Jasiel Welsh's direction and personally dictated by me on 03/13/2024 . I have reviewed the chart and agree that the record accurately reflects my personal performance of the history, Physical exam, assessment, and plan. I have also personally directed, reviewed, and agree with the discharge instructions.

## 2024-03-13 NOTE — HISTORY OF PRESENT ILLNESS
[FreeTextEntry1] : Mr. Power is a 78-year-old male with a history of bronchomalacia, chronic cough, elevated IgE, LPRD, overweight, PND, snoring, SOB and tracheomalacia presenting to the office today for a follow up visit. His chief complaint is.   -He notes some chest pressure and feels buildup of mucus that doesn't clear -He notes some coughing -He notes having influenza a week ago  -He notes his bowels are regular -he notes vision is normal -he notes chronic neck pain -he notes dysphonia -He notes using a nebulizer 2x day -He notes taking 2 prednisone for the next 2 days and ten will taper to 1x a day   -he denies any headaches, nausea, emesis, fever, chills, sweats, chest pain, wheezing, palpitations, constipation, diarrhea, vertigo, dysphagia, heartburn, reflux, itchy eyes, itchy ears, leg swelling, leg pain, arthralgias, myalgias, hoarseness, or sour taste in the mouth.

## 2024-03-13 NOTE — ASSESSMENT
[FreeTextEntry1] : Mr. 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)  His shortness of breath is multifactorial due to: -out of shape -poor breathing mechanics -CAD -pulmonary diseases including asthma and (+) tracheomalacia  His chronic cough is multifactorial, possibly due to: -post-nasal drip syndrome/allergies -asthma -PNA -LPR -bronchomalacia -eosinophilic bronchitis -sensory neuropathic cough  problem 1: asthma- Active -s/p Prednisone 20 mg x 7 days, 10 mg x 7 days (2022), 93/2024) -add Xopenex 0.63 via nebulizer q6H prn -BID -continue Dulera 200 2 inhalations BID with Aerochamber Spacer -continue Tudorza 1 inhalation BID -continue Singulair 10mg QHS -Inhaler technique reviewed as well as oral hygiene techniques reviewed with patient. Avoidance of cold air, extremes of temperature, rescue inhaler should be used before exercise. Order of medication reviewed with patient. Recommended use of a cool mist humidifier in the bedroom. Instructed to gargle and spit after inhaler use. -Asthma is believed to be caused by inherited (genetic) and environmental factor, but its exact cause is unknown. Asthma may be triggered by allergens, lung infections, or irritants in the air. Asthma triggers are different for each person  Problem 1A: PNA (RML) - resolved 3/2023 - recheck PNA titers -add Zithromax 500 mg x 7 days Chest X-Ray/CT revealed an infiltrate c/w pneumonia; this based on the patient's clinical scenario required additional therapy. Any change in patient's status will require hospitalization at the nearest hospital and a local ambulance will need to be called. Our group is on staff at [facility] and [facility] as consultants. The patient/family is instructed to notify our office with any change in condition.  Problem 1B: TBM s/p tracheoplasty -f/p with Dr. Diez al.  -Add Aerobika- mucus clearing clearance device. -Tracheomalacia is usually acquired in adults and common causes include damage by tracheostomy or endotracheal intubation damaging the tracheal cartilage with increase risk with multiple intubations, prolonged intubation, and concurrent high dose steroid therapy; external chest wall trauma and surgery; chronic compression of the trachea by benign etiologies (eg, benign mediastinal goiter) or malignancy; relapsing polychondritis; or recurrent infection. Tracheomalacia can be asymptomatic, however signs or symptoms can develop as the severity of the airway narrowing progresses with major symptoms include dyspnea, cough, and sputum retention. Other symptoms include severe paroxysms of coughing, wheezing or stridor, barking cough and may be exacerbated by forced expiration, cough, and Valsalva maneuver. Tracheomalacia is diagnosed by a bronchoscopic visualization of dynamic airway collapse on dynamic chest CT. Therapy is warranted in symptomatic patients with severe tracheomalacia and includes surgical repair as tracheobronchoplasty. The patient was referred to Dr. Alexandro Hunt or Dr. Vicente Montesinos at [facility] for a surgical consult.  problem 1C: elevated IgE level -he is a candidate for Xolair injections, no need to implement at this point in time -Xolair is a recombinant DNA- derived humanized IgG1K monoclonal antibody that selectively binds to human immunoglobulin E (IgE). Xolair is produced by a Chinese hamster ovary cell suspension culture in nutrient medium containing the antibiotic gentamicin. Gentamicin is not detectable in the final product. Xolair is a sterile, white, preservative free, lyophilized powder contained in a single use vial that is reconstituted with sterile water for suspension. Side effects include: wheezing, tightness of the chest, trouble breathing, hives, skin rash, feeling anxious or light-headed, fainting, warmth or tingling under skin, or swelling of face, lips, or tongue  problem 2: allergies/post-nasal drip syndrome (s/p polyp surgery 10/2023) -add Dymista 1 sniff BID -continue Clarinex 5 mg QHS -Environmental measures for allergies were encouraged including mattress and pillow cover, air purifier, and environmental controls.  problem 3: LPR/GERD -recommended to control diet -Things to avoid including overeating, spicy foods, tight clothing, eating within three hours of bed, this list is not all inclusive. -For treatment of reflux, possible options discussed including diet control, H2 blockers, PPIs, as well as coating motility agents discussed as treatment options. Timing of meals and proximity of last meal to sleep were discussed. If symptoms persist, a formal gastrointestinal evaluation is needed. -Rule of 2's: Avoid eating too late, too fast, too much, too spicy or within two hours of bedtime  problem 4: bronchomalacia -s/p bronchoscopy with Dr. Theo Solo on 5/15/18, s/p surgery -recommended acapella device to be used regularly  problem 5: OSAS -recommended to complete a home sleep study -Sleep apnea is associated with adverse clinical consequences which an affect most organ systems. Cardiovascular disease risk includes arrhythmias, atrial fibrillation, hypertension, coronary artery disease, and stroke. Metabolic disorders include diabetes type 2, non-alcoholic fatty liver disease. Mood disorder especially depression; and cognitive decline especially in the elderly. Associations with chronic reflux/Saucedo's esophagus some but not all inclusive. -Reasons include arousal consistent with hypopnea; respiratory events most prominent in REM sleep or supine position; therefore sleep staging and body position are important for accurate diagnosis and estimation of AHI.  problem 6: poor breathing mechanics -Proper breathing techniques were reviewed with an emphasis of exhalation. Patient instructed to breath in for 1 second and out for four seconds. Patient was encouraged to not talk while walking.  problem 7: cardiac health -recommended to follow up with a cardiologist Dr. Frye and Dr. Simmons  Problem 7A: ?Anemia -complete iron studies and BNP prn -Aside from OSAS other etiologies include thyroid disease, anemia, low testosterone levels and vitamin deficiencies as well as medication effects. Based on this recommended: CBC, thyroid function test, Vitamin D, levels, sleep study, and free and total testosterone levels. Dr. Welsh went over topic multiple times and discussed for an extensive period of time.  problem 8: out of shape -Exercise and diet control were discussed and are highly encouraged. Treatment options were given such as, aqua therapy, and contacting a nutritionist. Recommended to use the elliptical, stationary bike, less use of treadmill. Mindful eating was explained to the patient. Obesity is associated with worsening asthma, shortness of breath, and potential for cardiac disease, diabetes, and other underlying medical conditions.  problem 9: sensory neuropathic cough -continue Amitriptyline 10 mg QHS, up to TID Sensory neuropathic cough is an etiology of cough that is often realized once someone has been ruled out for common disease such as: asthma, COPD, eosinophilic bronchitis, bronchiectasis, post nasal drip, and GERD. It sometimes develops following a URI, herpes zoster outbreak in pharynx or thyroid or cervical spine injury. However, many patients have no identifiable antecedent explanation.  Problem 10: Cardiac - progressive aortic stenosis  -recommended f/u with Dr. Frye  problem 11: health maintenance -recommended RSV vaccination -s/p influenza vaccination 2023 -recommended strep pneumonia vaccines: Prevnar-13 vaccine, followed by Pneumo vaccine 23 one year following (completed) -recommended early intervention for URIs -recommended regular osteoporosis evaluations -recommended early dermatological evaluations -recommended after the age of 50 to the age of 70, colonoscopy every 5 years  Follow up in 2 months Patient is encouraged to call with any changes, concerns or questions.

## 2024-03-28 ENCOUNTER — APPOINTMENT (OUTPATIENT)
Dept: CARDIOLOGY | Facility: CLINIC | Age: 79
End: 2024-03-28
Payer: MEDICARE

## 2024-03-28 ENCOUNTER — NON-APPOINTMENT (OUTPATIENT)
Age: 79
End: 2024-03-28

## 2024-03-28 VITALS
DIASTOLIC BLOOD PRESSURE: 66 MMHG | WEIGHT: 160 LBS | SYSTOLIC BLOOD PRESSURE: 114 MMHG | HEIGHT: 68 IN | HEART RATE: 65 BPM | BODY MASS INDEX: 24.25 KG/M2 | OXYGEN SATURATION: 98 %

## 2024-03-28 DIAGNOSIS — I72.4 ANEURYSM OF ARTERY OF LOWER EXTREMITY: ICD-10-CM

## 2024-03-28 DIAGNOSIS — E78.5 HYPERLIPIDEMIA, UNSPECIFIED: ICD-10-CM

## 2024-03-28 DIAGNOSIS — I48.0 PAROXYSMAL ATRIAL FIBRILLATION: ICD-10-CM

## 2024-03-28 DIAGNOSIS — Z77.090 CONTACT WITH AND (SUSPECTED) EXPOSURE TO ASBESTOS: ICD-10-CM

## 2024-03-28 DIAGNOSIS — Z86.79 OTHER SPECIFIED POSTPROCEDURAL STATES: ICD-10-CM

## 2024-03-28 DIAGNOSIS — Z98.890 OTHER SPECIFIED POSTPROCEDURAL STATES: ICD-10-CM

## 2024-03-28 DIAGNOSIS — R05.3 CHRONIC COUGH: ICD-10-CM

## 2024-03-28 DIAGNOSIS — J45.909 UNSPECIFIED ASTHMA, UNCOMPLICATED: ICD-10-CM

## 2024-03-28 DIAGNOSIS — I35.9 NONRHEUMATIC AORTIC VALVE DISORDER, UNSPECIFIED: ICD-10-CM

## 2024-03-28 DIAGNOSIS — I35.0 NONRHEUMATIC AORTIC (VALVE) STENOSIS: ICD-10-CM

## 2024-03-28 DIAGNOSIS — I25.10 ATHEROSCLEROTIC HEART DISEASE OF NATIVE CORONARY ARTERY W/OUT ANGINA PECTORIS: ICD-10-CM

## 2024-03-28 DIAGNOSIS — I35.1 NONRHEUMATIC AORTIC (VALVE) INSUFFICIENCY: ICD-10-CM

## 2024-03-28 DIAGNOSIS — Z95.820 PERIPHERAL VASCULAR ANGIOPLASTY STATUS WITH IMPLANTS AND GRAFTS: ICD-10-CM

## 2024-03-28 PROCEDURE — G2211 COMPLEX E/M VISIT ADD ON: CPT

## 2024-03-28 PROCEDURE — 99215 OFFICE O/P EST HI 40 MIN: CPT

## 2024-03-28 PROCEDURE — 93000 ELECTROCARDIOGRAM COMPLETE: CPT

## 2024-03-28 PROCEDURE — 93306 TTE W/DOPPLER COMPLETE: CPT

## 2024-03-28 NOTE — DISCUSSION/SUMMARY
[FreeTextEntry1] : 1) He will remain on the Eliquis because of PAF, after a long discussion with him and his wife about the risk of stroke, as well as the possibility of getting a watchman if he felt that strongly that he wanted to stop the Eliquis 2) I will maintain the Beta blocker for now for PAF control 3) He will continue his other medications such as BP and lipid lowering 4) Echo to evaluate Aortic murmur showed moderate Aortic stenosis. Repeat  unchanged 5) F/u in 6 months  [EKG obtained to assist in diagnosis and management of assessed problem(s)] : EKG obtained to assist in diagnosis and management of assessed problem(s)

## 2024-03-28 NOTE — PHYSICAL EXAM
[Normal Appearance] : normal appearance [General Appearance - Well Developed] : well developed [Well Groomed] : well groomed [General Appearance - Well Nourished] : well nourished [No Deformities] : no deformities [General Appearance - In No Acute Distress] : no acute distress [Normal Oral Mucosa] : normal oral mucosa [Eyelids - No Xanthelasma] : the eyelids demonstrated no xanthelasmas [Normal Conjunctiva] : the conjunctiva exhibited no abnormalities [No Oral Pallor] : no oral pallor [No Oral Cyanosis] : no oral cyanosis [Normal Jugular Venous A Waves Present] : normal jugular venous A waves present [Normal Jugular Venous V Waves Present] : normal jugular venous V waves present [No Jugular Venous Correa A Waves] : no jugular venous correa A waves [Respiration, Rhythm And Depth] : normal respiratory rhythm and effort [Exaggerated Use Of Accessory Muscles For Inspiration] : no accessory muscle use [Auscultation Breath Sounds / Voice Sounds] : lungs were clear to auscultation bilaterally [Heart Sounds] : normal S1 and S2 [Heart Rate And Rhythm] : heart rate and rhythm were normal [Abdomen Soft] : soft [Systolic grade ___/6] : A grade [unfilled]/6 systolic murmur was heard. [Abdomen Mass (___ Cm)] : no abdominal mass palpated [Abdomen Tenderness] : non-tender [Abnormal Walk] : normal gait [Gait - Sufficient For Exercise Testing] : the gait was sufficient for exercise testing [Nail Clubbing] : no clubbing of the fingernails [Cyanosis, Localized] : no localized cyanosis [Petechial Hemorrhages (___cm)] : no petechial hemorrhages [] : no rash [Skin Color & Pigmentation] : normal skin color and pigmentation [No Venous Stasis] : no venous stasis [Skin Lesions] : no skin lesions [No Skin Ulcers] : no skin ulcer [No Xanthoma] : no  xanthoma was observed [Oriented To Time, Place, And Person] : oriented to person, place, and time [No Anxiety] : not feeling anxious [Mood] : the mood was normal [Affect] : the affect was normal

## 2024-03-28 NOTE — REASON FOR VISIT
[Arrhythmia/ECG Abnorrmalities] : arrhythmia/ECG abnormalities [FreeTextEntry3] : Dr. Lagos [Structural Heart and Valve Disease] : structural heart and valve disease [FreeTextEntry1] : I saw this 78-year-old man in followup consultation on 03/28/24 He had coronary artery stenting done in 2006 with 5 stents. He had a popliteal bypass of that same year. He had atrial fibrillation ablation in 2010, and again in 2018.. He takes medication for his lipids and is asymptomatic and doing well. He had a nuclear stress test which showed NO ischemia He recently had a thoracic procedure which  enhanced his breathing He comes in for a routine f/u with no complaints His lipids are well controlled The 3-day monitor did show episodes of supraventricular tachycardia and so I will maintain the Eliquis. The echo showed moderate aortic stenosis, and we will repeat the echo His main concern expressed today was that he would like to come off the Eliquis.  Discussed this with Dr. Hare who felt given his age and other comorbidities that the risk of him developing A-fib again were high.  He would have a high chads vascular score and should stay on the Eliquis. He recently had an upper respiratory tract infection and was sick for several weeks and is still recovering and feeling tired and weak. A repeat echo showed no change in the aortic valve. [Follow-Up - Clinic] : a clinic follow-up of [Atrial Fibrillation] : atrial fibrillation [Hyperlipidemia] : hyperlipidemia [Coronary Artery Disease] : coronary artery disease [Peripheral Vascular Disease] : peripheral vascular disease

## 2024-03-28 NOTE — HISTORY OF PRESENT ILLNESS
[Good] : Good [FreeTextEntry1] : he has no chest pain\par  He has some shortness of breath\par  He has no palpitations\par  He has no syncope\par  He is neurologically intact\par  He has no edema\par  He has no GI symptoms\par  \par  \par   [Fever] : no fever [Chills] : no chills [Cough] : no cough [Fatigue] : no fatigue [Chest Pain] : no chest pain [Dysuria] : no dysuria [Dyspnea] : no dyspnea [Vomiting] : no vomiting [Nausea] : no nausea [Urinary Frequency] : no urinary frequency [Diarrhea] : no diarrhea [Abdominal Pain] : no abdominal pain [Lower Extremity Swelling] : no lower extremity swelling [Easy Bruising] : no easy bruising [Poor Exercise Tolerance] : no poor exercise tolerance [Diabetes] : no diabetes [Pulmonary Disease] : no pulmonary disease [Cardiovascular Disease] : cardiovascular disease [Nicotine Dependence] : no nicotine dependence [Alcohol Use] : no  alcohol use [Anti-Platelet Agents] : anti-platelet agents [Renal Disease] : no renal disease [GI Disease] : no gastrointestinal disease [Sleep Apnea] : no sleep apnea [Thromboembolic Problems] : no thromboembolic problems [Frequent use of NSAIDs] : no use of NSAIDs [Transfusion Reaction] : no transfusion reaction [Steroid Use in Last 6 Months] : no steroid use in the last six months [Impaired Immunity] : no impaired immunity [Frequent Aspirin Use] : frequent aspirin use [Electrocardiogram] : ~T an ECG ~C was performed [Prior Anesthesia] : Prior anesthesia [Prev Anesthesia Reaction] : no previous anesthesia reaction [Echocardiogram] : ~T an echocardiogram ~C was performed [Cardiovascular Stress Test] : a cardiac stress test ~T ~C was performed [Anesthesia Reaction] : no anesthesia reaction [Sudden Death] : no sudden death [Clotting Disorder] : no clotting disorder [Bleeding Disorder] : no bleeding disorder

## 2024-04-24 ENCOUNTER — APPOINTMENT (OUTPATIENT)
Dept: PULMONOLOGY | Facility: CLINIC | Age: 79
End: 2024-04-24

## 2024-07-08 ENCOUNTER — APPOINTMENT (OUTPATIENT)
Dept: PULMONOLOGY | Facility: CLINIC | Age: 79
End: 2024-07-08
Payer: MEDICARE

## 2024-07-08 VITALS
HEIGHT: 68 IN | OXYGEN SATURATION: 97 % | DIASTOLIC BLOOD PRESSURE: 74 MMHG | WEIGHT: 157.6 LBS | TEMPERATURE: 98.1 F | RESPIRATION RATE: 16 BRPM | HEART RATE: 66 BPM | SYSTOLIC BLOOD PRESSURE: 126 MMHG | BODY MASS INDEX: 23.89 KG/M2

## 2024-07-08 DIAGNOSIS — R05.3 CHRONIC COUGH: ICD-10-CM

## 2024-07-08 DIAGNOSIS — K21.9 GASTRO-ESOPHAGEAL REFLUX DISEASE W/OUT ESOPHAGITIS: ICD-10-CM

## 2024-07-08 DIAGNOSIS — J30.9 ALLERGIC RHINITIS, UNSPECIFIED: ICD-10-CM

## 2024-07-08 DIAGNOSIS — R09.82 POSTNASAL DRIP: ICD-10-CM

## 2024-07-08 DIAGNOSIS — J45.909 UNSPECIFIED ASTHMA, UNCOMPLICATED: ICD-10-CM

## 2024-07-08 DIAGNOSIS — E66.3 OVERWEIGHT: ICD-10-CM

## 2024-07-08 DIAGNOSIS — R06.02 SHORTNESS OF BREATH: ICD-10-CM

## 2024-07-08 DIAGNOSIS — Z23 ENCOUNTER FOR IMMUNIZATION: ICD-10-CM

## 2024-07-08 DIAGNOSIS — J39.8 OTHER SPECIFIED DISEASES OF UPPER RESPIRATORY TRACT: ICD-10-CM

## 2024-07-08 PROCEDURE — 94729 DIFFUSING CAPACITY: CPT

## 2024-07-08 PROCEDURE — 90471 IMMUNIZATION ADMIN: CPT

## 2024-07-08 PROCEDURE — 99214 OFFICE O/P EST MOD 30 MIN: CPT | Mod: 25

## 2024-07-08 PROCEDURE — 94010 BREATHING CAPACITY TEST: CPT

## 2024-07-08 PROCEDURE — 94727 GAS DIL/WSHOT DETER LNG VOL: CPT

## 2024-07-08 PROCEDURE — 90715 TDAP VACCINE 7 YRS/> IM: CPT | Mod: GY

## 2024-07-08 PROCEDURE — 95012 NITRIC OXIDE EXP GAS DETER: CPT

## 2024-07-26 ENCOUNTER — APPOINTMENT (OUTPATIENT)
Dept: PULMONOLOGY | Facility: CLINIC | Age: 79
End: 2024-07-26

## 2024-08-05 NOTE — ASSESSMENT
----- Message from Pollo Medeiros sent at 8/5/2024  2:45 PM EDT -----  Negative   [FreeTextEntry1] : Mr. Power is a 77 year old male with a history of peripheral vascular disease, A-fib s/p ablation, CAD, HLD, asthma, asbestos exposure, nonsmoker, TBM, s/p TBM surgery, BPH who presents for a pulmonary evaluation now w/ SOB c/w PNA in RML/ active asthma\par \par His shortness of breath is multifactorial due to:\par -out of shape\par -poor breathing mechanics\par -CAD\par -pulmonary diseases including asthma and (+) tracheomalacia\par \par His chronic cough is multifactorial, possibly due to:\par -post-nasal drip syndrome/allergies\par -asthma\par -PNA\par -LPR\par -bronchomalacia\par -eosinophilic bronchitis\par -sensory neuropathic cough\par \par problem 1: asthma- flair\par -add Prednisone 20 mg x 7 days, 10 mg x 7 days \par -add Xopenex 0.63 via nebulizer q6H prn \par -continue Dulera 200 2 inhalations BID with Aerochamber Spacer\par -continue Tudorza 1 inhalation BID \par -continue Singulair 10mg QHS \par -Inhaler technique reviewed as well as oral hygiene techniques reviewed with patient. Avoidance of cold air, extremes of temperature, rescue inhaler should be used before exercise. Order of medication reviewed with patient. Recommended use of a cool mist humidifier in the bedroom. Instructed to gargle and spit after inhaler use.\par -Asthma is  believed to be caused by inherited (genetic) and environmental factor, but its exact cause is unknown. Asthma may be triggered by allergens, lung infections, or irritants in the air. Asthma triggers are different for each person \par \par Problem 1A: PNA (RML)\par -add Zithromax 500 mg x 7 days \par Chest X-Ray/CT revealed an infiltrate c/w pneumonia; this based on the patient's clinical scenario required additional therapy. Any change in patient's status will require hospitalization at the nearest hospital and a local ambulance will need to be called. Our group is on staff at [facility] and [facility] as consultants. The patient/family is instructed to notify our office with any change in condition. \par \par Problem 1B: TBM s/p tracheoplasty\par -f/p with Dr. Diez al.\par -Tracheomalacia is usually acquired in adults and common causes include damage by tracheostomy or endotracheal intubation damaging the tracheal cartilage with increase risk with multiple intubations, prolonged intubation, and concurrent high dose steroid therapy; external chest wall trauma and surgery; chronic compression of the trachea by benign etiologies (eg, benign mediastinal goiter) or malignancy; relapsing polychondritis; or recurrent infection. Tracheomalacia can be asymptomatic, however signs or symptoms can develop as the severity of the airway narrowing progresses with major symptoms include dyspnea, cough, and sputum retention. Other symptoms include severe paroxysms of coughing, wheezing or stridor, barking cough and may be exacerbated by forced expiration, cough, and Valsalva maneuver. Tracheomalacia is diagnosed by a bronchoscopic visualization of dynamic airway collapse on dynamic chest CT. Therapy is warranted in symptomatic patients with severe tracheomalacia and includes surgical repair as tracheobronchoplasty. The patient was referred to Dr. Alexandro Hunt or Dr. Vicente Montesinos at [facility] for a surgical consult. \par \par problem 2: allergies/post-nasal drip syndrome\par -continue Clarinex 5 mg QHS \par -Environmental measures for allergies were encouraged including mattress and pillow cover, air purifier, and environmental controls. \par \par problem 3: LPR/GERD\par -recommended to control diet\par -Things to avoid including overeating, spicy foods, tight clothing, eating within three hours of bed, this list is not all inclusive. \par -For treatment of reflux, possible options discussed including diet control, H2 blockers, PPIs, as well as coating motility agents discussed as treatment options. Timing of meals and proximity of last meal to sleep were discussed. If symptoms persist, a formal gastrointestinal evaluation is needed.\par -Rule of 2's: Avoid eating too late, too fast, too much, too spicy or within two hours of bedtime \par \par problem 4: bronchomalacia \par -s/p bronchoscopy with Dr. Theo Solo on 5/15/18, now awaiting surgery\par -recommended acapella device to be used regularly \par \par problem 5: OSAS \par -recommended to complete a home sleep study \par -Sleep apnea is associated with adverse clinical consequences which an affect most organ systems.  Cardiovascular disease risk includes arrhythmias, atrial fibrillation, hypertension, coronary artery disease, and stroke. Metabolic disorders include diabetes type 2, non-alcoholic fatty liver disease. Mood disorder especially depression; and cognitive decline especially in the elderly. Associations with  chronic reflux/Saucedo’s esophagus some but not all inclusive. \par -Reasons  include arousal consistent with hypopnea; respiratory events most prominent in REM sleep or supine position; therefore sleep staging and body position are important for accurate diagnosis and estimation of AHI. \par \par problem 6: poor breathing mechanics\par -Proper breathing techniques were reviewed with an emphasis of exhalation. Patient instructed to breath in for 1 second and out for four seconds. Patient was encouraged to not talk while walking. \par \par problem 7: cardiac health\par -recommended to follow up with a cardiologist Dr. Frye and Dr. Simmons\par \par problem 8: out of shape\par -Exercise and diet control were discussed and are highly encouraged. Treatment options were given such as, aqua therapy, and contacting a nutritionist. Recommended to use the elliptical, stationary bike, less use of treadmill. Mindful eating was explained to the patient. Obesity is associated with worsening asthma, shortness of breath, and potential for cardiac disease, diabetes, and other underlying medical conditions. \par \par problem 9: sensory neuropathic cough\par -continue Amitriptyline 10 mg QHS, up to TID\par Sensory neuropathic cough is an etiology of cough that is often realized once someone has been ruled out for common disease such as: asthma, COPD, eosinophilic bronchitis, bronchiectasis, post nasal drip, and GERD. It sometimes develops following a URI, herpes zoster outbreak in pharynx or thyroid or cervical spine injury. However, many patients have no identifiable antecedent explanation. \par \par problem 10: elevated IgE level\par -he is a candidate for Xolair injections, no need to implement at this point in time\par -Xolair is a recombinant DNA- derived humanized IgG1K monoclonal antibody that selectively binds to human immunoglobulin E (IgE). Xolair is produced by a Chinese hamster ovary cell suspension culture in nutrient medium containing the antibiotic gentamicin. Gentamicin is not detectable in the final product. Xolair is a sterile, white, preservative free, lyophilized powder contained in a single use vial that is reconstituted with sterile water for suspension. Side effects include: wheezing, tightness of the chest, trouble breathing, hives, skin rash, feeling anxious or light-headed, fainting, warmth or tingling under skin, or swelling of face, lips, or tongue \par \par problem 11: health maintenance \par -s/p influenza vaccination 2022\par -recommended strep pneumonia vaccines: Prevnar-13 vaccine, followed by Pneumo vaccine 23 one year following\par -recommended early intervention for URIs\par -recommended regular osteoporosis evaluations\par -recommended early dermatological evaluations\par -recommended after the age of 50 to the age of 70, colonoscopy every 5 years \par  \par \par Follow up in 2 months\par Patient is encouraged to call with any changes, concerns or questions.

## 2024-09-03 ENCOUNTER — RX RENEWAL (OUTPATIENT)
Age: 79
End: 2024-09-03

## 2024-09-03 RX ORDER — FLUTICASONE PROPIONATE AND SALMETEROL 250; 50 UG/1; UG/1
250-50 POWDER RESPIRATORY (INHALATION)
Qty: 180 | Refills: 1 | Status: ACTIVE | COMMUNITY
Start: 2024-09-03 | End: 1900-01-01

## 2024-10-01 ENCOUNTER — APPOINTMENT (OUTPATIENT)
Dept: VASCULAR SURGERY | Facility: CLINIC | Age: 79
End: 2024-10-01
Payer: MEDICARE

## 2024-10-01 VITALS
SYSTOLIC BLOOD PRESSURE: 108 MMHG | WEIGHT: 155 LBS | DIASTOLIC BLOOD PRESSURE: 78 MMHG | HEIGHT: 68 IN | HEART RATE: 54 BPM | OXYGEN SATURATION: 94 % | BODY MASS INDEX: 23.49 KG/M2

## 2024-10-01 DIAGNOSIS — Z86.79 PERSONAL HISTORY OF OTHER DISEASES OF THE CIRCULATORY SYSTEM: ICD-10-CM

## 2024-10-01 PROBLEM — I83.819 VARICOSE VEINS WITH PAIN: Status: ACTIVE | Noted: 2024-10-01

## 2024-10-01 PROCEDURE — 99203 OFFICE O/P NEW LOW 30 MIN: CPT

## 2024-10-01 NOTE — REVIEW OF SYSTEMS
[Leg Claudication] : no intermittent leg claudication [Lower Ext Edema] : lower extremity edema [Limb Swelling] : limb swelling [Negative] : Neurological

## 2024-10-01 NOTE — HISTORY OF PRESENT ILLNESS
[FreeTextEntry1] : 78-year-old gentleman is being referred by Dr. Wilburn to be evaluated for progressive varicose veins in both lower extremities.  Patient has a long history of bilateral varicose veins.  He denies any history of phlebitis.  There is no history of DVT.  He is being anticoagulated for chronic atrial fibrillation.

## 2024-10-01 NOTE — PHYSICAL EXAM
[JVD] : no jugular venous distention  [Carotid Bruits] : carotid bruit  [Normal Breath Sounds] : Normal breath sounds [Murmur] : murmur was appreciated  [Abdominal Aorta] : Normal abdominal aorta [Femoral Arteries] : Normal femoral pulses [2+] : left 2+ [Right Carotid Bruit] : right carotid bruit heard [Left Carotid Bruit] : left carotid bruit heard [1+] : left 1+ [Ankle Swelling (On Exam)] : present [Ankle Swelling Bilaterally] : bilaterally  [Varicose Veins Of Lower Extremities] : bilaterally [] : bilaterally [Ankle Swelling On The Right] : mild [Abdomen Masses] : No abdominal masses [Abdomen Tenderness] : ~T ~M No abdominal tenderness [No HSM] : no hepatosplenomegaly [Skin Ulcer] : no ulcer [Skin Induration] : induration [Alert] : not alert [Calm] : calm [de-identified] : No acute distress [de-identified] : Stasis dermatitis both legs

## 2024-10-07 PROBLEM — Z86.79 HISTORY OF ANEURYSM: Status: RESOLVED | Noted: 2024-10-01 | Resolved: 2024-10-07

## 2024-10-08 ENCOUNTER — APPOINTMENT (OUTPATIENT)
Dept: VASCULAR SURGERY | Facility: CLINIC | Age: 79
End: 2024-10-08
Payer: MEDICARE

## 2024-10-08 ENCOUNTER — APPOINTMENT (OUTPATIENT)
Dept: CARDIOLOGY | Facility: CLINIC | Age: 79
End: 2024-10-08
Payer: MEDICARE

## 2024-10-08 VITALS
SYSTOLIC BLOOD PRESSURE: 112 MMHG | BODY MASS INDEX: 23.49 KG/M2 | OXYGEN SATURATION: 98 % | HEART RATE: 146 BPM | DIASTOLIC BLOOD PRESSURE: 80 MMHG | WEIGHT: 155 LBS | HEIGHT: 68 IN

## 2024-10-08 VITALS — HEART RATE: 146 BPM | OXYGEN SATURATION: 97 %

## 2024-10-08 DIAGNOSIS — I83.819 VARICOSE VEINS OF UNSPECIFIED LOWER EXTREMITY WITH PAIN: ICD-10-CM

## 2024-10-08 DIAGNOSIS — R00.2 PALPITATIONS: ICD-10-CM

## 2024-10-08 DIAGNOSIS — I48.4 ATYPICAL ATRIAL FLUTTER: ICD-10-CM

## 2024-10-08 PROCEDURE — 93000 ELECTROCARDIOGRAM COMPLETE: CPT

## 2024-10-08 PROCEDURE — 99213 OFFICE O/P EST LOW 20 MIN: CPT

## 2024-10-08 PROCEDURE — G2211 COMPLEX E/M VISIT ADD ON: CPT

## 2024-10-08 PROCEDURE — 93970 EXTREMITY STUDY: CPT

## 2024-10-08 PROCEDURE — 99215 OFFICE O/P EST HI 40 MIN: CPT

## 2024-10-08 RX ORDER — MOMETASONE 50 UG/1
50 SPRAY, METERED NASAL TWICE DAILY
Qty: 10 | Refills: 0 | Status: ACTIVE | COMMUNITY

## 2024-10-09 ENCOUNTER — APPOINTMENT (OUTPATIENT)
Dept: CARDIOLOGY | Facility: CLINIC | Age: 79
End: 2024-10-09
Payer: MEDICARE

## 2024-10-09 VITALS
SYSTOLIC BLOOD PRESSURE: 122 MMHG | BODY MASS INDEX: 24.4 KG/M2 | RESPIRATION RATE: 16 BRPM | HEIGHT: 68 IN | HEART RATE: 133 BPM | DIASTOLIC BLOOD PRESSURE: 80 MMHG | OXYGEN SATURATION: 96 % | WEIGHT: 161 LBS

## 2024-10-09 VITALS — SYSTOLIC BLOOD PRESSURE: 106 MMHG | DIASTOLIC BLOOD PRESSURE: 70 MMHG

## 2024-10-09 PROBLEM — I47.19 ATRIAL TACHYCARDIA: Status: ACTIVE | Noted: 2024-10-09

## 2024-10-09 LAB — HBA1C MFR BLD HPLC: 6

## 2024-10-09 PROCEDURE — 99214 OFFICE O/P EST MOD 30 MIN: CPT

## 2024-10-09 PROCEDURE — 93000 ELECTROCARDIOGRAM COMPLETE: CPT

## 2024-10-14 ENCOUNTER — NON-APPOINTMENT (OUTPATIENT)
Age: 79
End: 2024-10-14

## 2024-10-17 ENCOUNTER — APPOINTMENT (OUTPATIENT)
Dept: CARDIOLOGY | Facility: CLINIC | Age: 79
End: 2024-10-17
Payer: MEDICARE

## 2024-10-17 VITALS
BODY MASS INDEX: 24.4 KG/M2 | HEART RATE: 62 BPM | OXYGEN SATURATION: 97 % | HEIGHT: 68 IN | WEIGHT: 161 LBS | DIASTOLIC BLOOD PRESSURE: 64 MMHG | SYSTOLIC BLOOD PRESSURE: 110 MMHG

## 2024-10-17 DIAGNOSIS — Z86.79 OTHER SPECIFIED POSTPROCEDURAL STATES: ICD-10-CM

## 2024-10-17 DIAGNOSIS — I47.19 OTHER SUPRAVENTRICULAR TACHYCARDIA: ICD-10-CM

## 2024-10-17 DIAGNOSIS — Z95.820 PERIPHERAL VASCULAR ANGIOPLASTY STATUS WITH IMPLANTS AND GRAFTS: ICD-10-CM

## 2024-10-17 DIAGNOSIS — I25.10 ATHEROSCLEROTIC HEART DISEASE OF NATIVE CORONARY ARTERY W/OUT ANGINA PECTORIS: ICD-10-CM

## 2024-10-17 DIAGNOSIS — I48.0 PAROXYSMAL ATRIAL FIBRILLATION: ICD-10-CM

## 2024-10-17 DIAGNOSIS — Z98.890 OTHER SPECIFIED POSTPROCEDURAL STATES: ICD-10-CM

## 2024-10-17 DIAGNOSIS — I72.4 ANEURYSM OF ARTERY OF LOWER EXTREMITY: ICD-10-CM

## 2024-10-17 DIAGNOSIS — J45.909 UNSPECIFIED ASTHMA, UNCOMPLICATED: ICD-10-CM

## 2024-10-17 DIAGNOSIS — E78.5 HYPERLIPIDEMIA, UNSPECIFIED: ICD-10-CM

## 2024-10-17 DIAGNOSIS — I35.0 NONRHEUMATIC AORTIC (VALVE) STENOSIS: ICD-10-CM

## 2024-10-17 PROCEDURE — G2211 COMPLEX E/M VISIT ADD ON: CPT

## 2024-10-17 PROCEDURE — 99214 OFFICE O/P EST MOD 30 MIN: CPT

## 2024-10-18 ENCOUNTER — APPOINTMENT (OUTPATIENT)
Dept: VASCULAR SURGERY | Facility: CLINIC | Age: 79
End: 2024-10-18

## 2024-11-01 ENCOUNTER — APPOINTMENT (OUTPATIENT)
Dept: CARDIOLOGY | Facility: CLINIC | Age: 79
End: 2024-11-01
Payer: MEDICARE

## 2024-11-01 ENCOUNTER — NON-APPOINTMENT (OUTPATIENT)
Age: 79
End: 2024-11-01

## 2024-11-01 VITALS
SYSTOLIC BLOOD PRESSURE: 112 MMHG | HEIGHT: 68 IN | HEART RATE: 56 BPM | BODY MASS INDEX: 24.25 KG/M2 | DIASTOLIC BLOOD PRESSURE: 72 MMHG | OXYGEN SATURATION: 99 % | WEIGHT: 160 LBS

## 2024-11-01 DIAGNOSIS — I48.91 UNSPECIFIED ATRIAL FIBRILLATION: ICD-10-CM

## 2024-11-01 PROCEDURE — 99215 OFFICE O/P EST HI 40 MIN: CPT

## 2024-11-01 PROCEDURE — 93000 ELECTROCARDIOGRAM COMPLETE: CPT

## 2024-11-01 PROCEDURE — G2211 COMPLEX E/M VISIT ADD ON: CPT

## 2024-11-01 RX ORDER — AMIODARONE HYDROCHLORIDE 200 MG/1
200 TABLET ORAL
Qty: 90 | Refills: 3 | Status: ACTIVE | COMMUNITY
Start: 2024-11-01 | End: 1900-01-01

## 2024-11-08 ENCOUNTER — NON-APPOINTMENT (OUTPATIENT)
Age: 79
End: 2024-11-08

## 2024-11-08 ENCOUNTER — APPOINTMENT (OUTPATIENT)
Dept: ELECTROPHYSIOLOGY | Facility: CLINIC | Age: 79
End: 2024-11-08

## 2024-11-08 PROCEDURE — 93000 ELECTROCARDIOGRAM COMPLETE: CPT

## 2024-11-08 PROCEDURE — 99214 OFFICE O/P EST MOD 30 MIN: CPT

## 2024-11-14 ENCOUNTER — APPOINTMENT (OUTPATIENT)
Dept: PULMONOLOGY | Facility: CLINIC | Age: 79
End: 2024-11-14
Payer: MEDICARE

## 2024-11-14 VITALS
BODY MASS INDEX: 23.49 KG/M2 | HEIGHT: 68 IN | SYSTOLIC BLOOD PRESSURE: 110 MMHG | OXYGEN SATURATION: 98 % | RESPIRATION RATE: 16 BRPM | HEART RATE: 67 BPM | DIASTOLIC BLOOD PRESSURE: 72 MMHG | TEMPERATURE: 98.1 F | WEIGHT: 155 LBS

## 2024-11-14 DIAGNOSIS — J09.X2 INFLUENZA DUE TO IDENTIFIED NOVEL INFLUENZA A VIRUS WITH OTHER RESPIRATORY MANIFESTATIONS: ICD-10-CM

## 2024-11-14 DIAGNOSIS — R06.02 SHORTNESS OF BREATH: ICD-10-CM

## 2024-11-14 DIAGNOSIS — J98.09 OTHER DISEASES OF BRONCHUS, NOT ELSEWHERE CLASSIFIED: ICD-10-CM

## 2024-11-14 DIAGNOSIS — J45.909 UNSPECIFIED ASTHMA, UNCOMPLICATED: ICD-10-CM

## 2024-11-14 DIAGNOSIS — K21.9 GASTRO-ESOPHAGEAL REFLUX DISEASE W/OUT ESOPHAGITIS: ICD-10-CM

## 2024-11-14 DIAGNOSIS — J39.8 OTHER SPECIFIED DISEASES OF UPPER RESPIRATORY TRACT: ICD-10-CM

## 2024-11-14 DIAGNOSIS — Z79.899 OTHER LONG TERM (CURRENT) DRUG THERAPY: ICD-10-CM

## 2024-11-14 DIAGNOSIS — J30.9 ALLERGIC RHINITIS, UNSPECIFIED: ICD-10-CM

## 2024-11-14 PROCEDURE — 94010 BREATHING CAPACITY TEST: CPT

## 2024-11-14 PROCEDURE — 95012 NITRIC OXIDE EXP GAS DETER: CPT

## 2024-11-14 PROCEDURE — 94729 DIFFUSING CAPACITY: CPT

## 2024-11-14 PROCEDURE — 99214 OFFICE O/P EST MOD 30 MIN: CPT | Mod: 25

## 2024-11-14 PROCEDURE — 94727 GAS DIL/WSHOT DETER LNG VOL: CPT

## 2024-11-19 ENCOUNTER — APPOINTMENT (OUTPATIENT)
Dept: ELECTROPHYSIOLOGY | Facility: CLINIC | Age: 79
End: 2024-11-19

## 2025-01-02 ENCOUNTER — NON-APPOINTMENT (OUTPATIENT)
Age: 80
End: 2025-01-02

## 2025-01-02 ENCOUNTER — APPOINTMENT (OUTPATIENT)
Dept: CARDIOLOGY | Facility: CLINIC | Age: 80
End: 2025-01-02
Payer: MEDICARE

## 2025-01-02 VITALS
DIASTOLIC BLOOD PRESSURE: 66 MMHG | HEART RATE: 68 BPM | BODY MASS INDEX: 23.95 KG/M2 | SYSTOLIC BLOOD PRESSURE: 128 MMHG | HEIGHT: 68 IN | WEIGHT: 158 LBS | OXYGEN SATURATION: 99 %

## 2025-01-02 DIAGNOSIS — I48.0 PAROXYSMAL ATRIAL FIBRILLATION: ICD-10-CM

## 2025-01-02 DIAGNOSIS — J45.909 UNSPECIFIED ASTHMA, UNCOMPLICATED: ICD-10-CM

## 2025-01-02 DIAGNOSIS — I48.91 UNSPECIFIED ATRIAL FIBRILLATION: ICD-10-CM

## 2025-01-02 DIAGNOSIS — I25.10 ATHEROSCLEROTIC HEART DISEASE OF NATIVE CORONARY ARTERY W/OUT ANGINA PECTORIS: ICD-10-CM

## 2025-01-02 DIAGNOSIS — E78.5 HYPERLIPIDEMIA, UNSPECIFIED: ICD-10-CM

## 2025-01-02 DIAGNOSIS — Z95.820 PERIPHERAL VASCULAR ANGIOPLASTY STATUS WITH IMPLANTS AND GRAFTS: ICD-10-CM

## 2025-01-02 DIAGNOSIS — I35.1 NONRHEUMATIC AORTIC (VALVE) INSUFFICIENCY: ICD-10-CM

## 2025-01-02 PROCEDURE — 99215 OFFICE O/P EST HI 40 MIN: CPT

## 2025-01-02 PROCEDURE — 93306 TTE W/DOPPLER COMPLETE: CPT

## 2025-01-02 PROCEDURE — 76376 3D RENDER W/INTRP POSTPROCES: CPT

## 2025-01-02 PROCEDURE — G2211 COMPLEX E/M VISIT ADD ON: CPT

## 2025-01-08 ENCOUNTER — NON-APPOINTMENT (OUTPATIENT)
Age: 80
End: 2025-01-08

## 2025-01-08 ENCOUNTER — APPOINTMENT (OUTPATIENT)
Dept: ELECTROPHYSIOLOGY | Facility: CLINIC | Age: 80
End: 2025-01-08
Payer: MEDICARE

## 2025-01-08 VITALS
BODY MASS INDEX: 23.04 KG/M2 | DIASTOLIC BLOOD PRESSURE: 62 MMHG | HEART RATE: 59 BPM | WEIGHT: 152 LBS | OXYGEN SATURATION: 98 % | SYSTOLIC BLOOD PRESSURE: 134 MMHG | HEIGHT: 68 IN

## 2025-01-08 DIAGNOSIS — I35.0 NONRHEUMATIC AORTIC (VALVE) STENOSIS: ICD-10-CM

## 2025-01-08 DIAGNOSIS — Z98.890 OTHER SPECIFIED POSTPROCEDURAL STATES: ICD-10-CM

## 2025-01-08 DIAGNOSIS — Z86.79 OTHER SPECIFIED POSTPROCEDURAL STATES: ICD-10-CM

## 2025-01-08 PROCEDURE — 93000 ELECTROCARDIOGRAM COMPLETE: CPT

## 2025-01-08 PROCEDURE — 99214 OFFICE O/P EST MOD 30 MIN: CPT

## 2025-01-08 RX ORDER — AMIODARONE HYDROCHLORIDE 100 MG/1
100 TABLET ORAL
Qty: 90 | Refills: 1 | Status: ACTIVE | COMMUNITY
Start: 2025-01-08 | End: 1900-01-01

## 2025-02-24 ENCOUNTER — OFFICE (OUTPATIENT)
Dept: URBAN - METROPOLITAN AREA CLINIC 12 | Facility: CLINIC | Age: 80
Setting detail: OPHTHALMOLOGY
End: 2025-02-24
Payer: MEDICARE

## 2025-02-24 VITALS — HEIGHT: 60 IN

## 2025-02-24 DIAGNOSIS — H02.831: ICD-10-CM

## 2025-02-24 DIAGNOSIS — H50.22: ICD-10-CM

## 2025-02-24 DIAGNOSIS — H35.373: ICD-10-CM

## 2025-02-24 DIAGNOSIS — H02.834: ICD-10-CM

## 2025-02-24 PROCEDURE — 92014 COMPRE OPH EXAM EST PT 1/>: CPT | Performed by: OPHTHALMOLOGY

## 2025-02-24 PROCEDURE — 92250 FUNDUS PHOTOGRAPHY W/I&R: CPT | Performed by: OPHTHALMOLOGY

## 2025-02-24 ASSESSMENT — REFRACTION_CURRENTRX
OD_ADD: +2.50
OS_AXIS: 095
OS_ADD: +2.50
OS_ADD: +2.50
OD_VPRISM_DIRECTION: PROGS
OS_SPHERE: -3.25
OS_OVR_VA: 20/
OS_CYLINDER: -0.50
OS_ADD: +2.25
OS_OVR_VA: 20/
OD_CYLINDER: -1.00
OD_OVR_VA: 20/
OD_VPRISM_DIRECTION: PROGS
OS_VPRISM_DIRECTION: PROGS
OD_ADD: +2.25
OD_ADD: +2.50
OD_AXIS: 079
OD_SPHERE: -0.50
OD_SPHERE: -3.00
OS_VPRISM_DIRECTION: PROGS
OS_CYLINDER: -0.50
OS_SPHERE: -1.00
OS_OVR_VA: 20/
OD_OVR_VA: 20/
OS_AXIS: 039
OD_CYLINDER: -0.50
OD_OVR_VA: 20/
OS_AXIS: 005
OD_AXIS: 093
OS_CYLINDER: -0.25
OS_VPRISM_DIRECTION: PROGS
OS_SPHERE: -0.50
OD_SPHERE: -0.50
OD_VPRISM_DIRECTION: PROGS
OD_CYLINDER: -1.00
OD_AXIS: 105

## 2025-02-24 ASSESSMENT — REFRACTION_MANIFEST
OD_CYLINDER: -1.25
OD_VA1: 20/30
OD_SPHERE: +0.25
OS_SPHERE: -2.50
OS_SPHERE: -0.50
OD_AXIS: 110
OD_VPRISM: BU
OS_AXIS: 010
OS_VA2: 20/20(J1+)
OS_VA1: 20/20-2
OS_CYLINDER: -0.25
OS_AXIS: 015
OS_CYLINDER: -0.25
OD_SPHERE: PLANO
OS_VA1: 20/25+
OD_AXIS: 100
OD_AXIS: 095
OD_VA1: 20/NI
OU_VA: 20/20-2
OD_ADD: +2.50
OS_AXIS: 065
OD_VA2: 20/20(J1+)
OD_CYLINDER: -0.50
OD_SPHERE: -2.00
OS_ADD: +2.50
OD_CYLINDER: -0.50
OS_SPHERE: -0.50
OS_ADD: +2.50
OD_VA1: 20/20
OS_VA1: 20/20
OS_VPRISM: BD
OS_CYLINDER: -0.25
OD_ADD: +2.50

## 2025-02-24 ASSESSMENT — LID POSITION - DERMATOCHALASIS
OD_DERMATOCHALASIS: 2+
OS_DERMATOCHALASIS: 2+

## 2025-02-24 ASSESSMENT — REFRACTION_AUTOREFRACTION
OS_SPHERE: -0.50
OS_AXIS: 063
OS_CYLINDER: -0.25
OD_CYLINDER: -1.25
OD_SPHERE: +0.25
OD_AXIS: 099

## 2025-02-24 ASSESSMENT — KERATOMETRY
OS_AXISANGLE_DEGREES: 090
OS_K1POWER_DIOPTERS: 45.50
METHOD_AUTO_MANUAL: AUTO
OD_AXISANGLE_DEGREES: 169
OS_K2POWER_DIOPTERS: 45.50
OD_K2POWER_DIOPTERS: 46.00
OD_K1POWER_DIOPTERS: 45.25

## 2025-02-24 ASSESSMENT — TONOMETRY
OD_IOP_MMHG: 11
OS_IOP_MMHG: 11

## 2025-02-24 ASSESSMENT — VISUAL ACUITY
OS_BCVA: 20/25-1
OD_BCVA: 20/30

## 2025-02-24 ASSESSMENT — CONFRONTATIONAL VISUAL FIELD TEST (CVF)
OD_FINDINGS: FULL
OS_FINDINGS: FULL

## 2025-02-24 ASSESSMENT — LID EXAM ASSESSMENTS: OD_EDEMA: ABSENT

## 2025-02-27 ENCOUNTER — APPOINTMENT (OUTPATIENT)
Dept: UROLOGY | Facility: CLINIC | Age: 80
End: 2025-02-27
Payer: MEDICARE

## 2025-02-27 DIAGNOSIS — N40.1 BENIGN PROSTATIC HYPERPLASIA WITH LOWER URINARY TRACT SYMPMS: ICD-10-CM

## 2025-02-27 DIAGNOSIS — N13.8 BENIGN PROSTATIC HYPERPLASIA WITH LOWER URINARY TRACT SYMPMS: ICD-10-CM

## 2025-02-27 PROCEDURE — 99204 OFFICE O/P NEW MOD 45 MIN: CPT

## 2025-02-28 LAB
APPEARANCE: CLEAR
BACTERIA: NEGATIVE /HPF
BILIRUBIN URINE: NEGATIVE
BLOOD URINE: NEGATIVE
CAST: 0 /LPF
COLOR: YELLOW
EPITHELIAL CELLS: 0 /HPF
GLUCOSE QUALITATIVE U: NEGATIVE MG/DL
KETONES URINE: NEGATIVE MG/DL
LEUKOCYTE ESTERASE URINE: NEGATIVE
MICROSCOPIC-UA: NORMAL
NITRITE URINE: NEGATIVE
PH URINE: 7
PROTEIN URINE: NEGATIVE MG/DL
PSA FREE FLD-MCNC: 14 %
PSA FREE SERPL-MCNC: 0.73 NG/ML
PSA SERPL-MCNC: 5.21 NG/ML
RED BLOOD CELLS URINE: 2 /HPF
SPECIFIC GRAVITY URINE: 1.02
UROBILINOGEN URINE: 0.2 MG/DL
WHITE BLOOD CELLS URINE: 0 /HPF

## 2025-03-24 DIAGNOSIS — Z11.59 ENCOUNTER FOR SCREENING FOR OTHER VIRAL DISEASES: ICD-10-CM

## 2025-03-25 LAB
RESP PATH DNA+RNA PNL NPH NAA+NON-PROBE: DETECTED
RV+EV RNA NPH QL NAA+NON-PROBE: DETECTED
SARS-COV-2 RNA RESP QL NAA+PROBE: NOT DETECTED

## 2025-03-27 RX ORDER — PREDNISONE 10 MG/1
10 TABLET ORAL
Qty: 30 | Refills: 0 | Status: ACTIVE | COMMUNITY
Start: 2025-03-27 | End: 1900-01-01

## 2025-03-28 RX ORDER — ACLIDINIUM BROMIDE 400 UG/1
400 POWDER, METERED RESPIRATORY (INHALATION)
Qty: 3 | Refills: 1 | Status: DISCONTINUED | COMMUNITY
Start: 2025-03-27 | End: 2025-03-28

## 2025-03-28 RX ORDER — TIOTROPIUM BROMIDE INHALATION SPRAY 3.12 UG/1
2.5 SPRAY, METERED RESPIRATORY (INHALATION) DAILY
Qty: 3 | Refills: 1 | Status: ACTIVE | COMMUNITY
Start: 2025-03-28 | End: 1900-01-01

## 2025-04-01 ENCOUNTER — APPOINTMENT (OUTPATIENT)
Dept: CARDIOTHORACIC SURGERY | Facility: CLINIC | Age: 80
End: 2025-04-01
Payer: MEDICARE

## 2025-04-01 ENCOUNTER — APPOINTMENT (OUTPATIENT)
Dept: CARDIOTHORACIC SURGERY | Facility: CLINIC | Age: 80
End: 2025-04-01

## 2025-04-01 VITALS
RESPIRATION RATE: 14 BRPM | HEART RATE: 56 BPM | OXYGEN SATURATION: 97 % | BODY MASS INDEX: 25.01 KG/M2 | DIASTOLIC BLOOD PRESSURE: 72 MMHG | HEIGHT: 68 IN | WEIGHT: 165 LBS | SYSTOLIC BLOOD PRESSURE: 140 MMHG

## 2025-04-01 DIAGNOSIS — I73.9 PERIPHERAL VASCULAR DISEASE, UNSPECIFIED: ICD-10-CM

## 2025-04-01 DIAGNOSIS — I48.0 PAROXYSMAL ATRIAL FIBRILLATION: ICD-10-CM

## 2025-04-01 DIAGNOSIS — R93.1 ABNORMAL FINDINGS ON DIAGNOSTIC IMAGING OF HEART AND CORONARY CIRCULATION: ICD-10-CM

## 2025-04-01 PROBLEM — I35.0 AORTIC STENOSIS: Status: ACTIVE | Noted: 2025-04-01

## 2025-04-01 PROCEDURE — 99214 OFFICE O/P EST MOD 30 MIN: CPT

## 2025-04-04 ENCOUNTER — APPOINTMENT (OUTPATIENT)
Dept: PULMONOLOGY | Facility: CLINIC | Age: 80
End: 2025-04-04
Payer: MEDICARE

## 2025-04-04 VITALS
BODY MASS INDEX: 23.49 KG/M2 | HEIGHT: 68 IN | WEIGHT: 155 LBS | OXYGEN SATURATION: 98 % | RESPIRATION RATE: 14 BRPM | DIASTOLIC BLOOD PRESSURE: 64 MMHG | SYSTOLIC BLOOD PRESSURE: 118 MMHG | HEART RATE: 59 BPM | TEMPERATURE: 97.7 F

## 2025-04-04 DIAGNOSIS — J45.909 UNSPECIFIED ASTHMA, UNCOMPLICATED: ICD-10-CM

## 2025-04-04 DIAGNOSIS — J30.9 ALLERGIC RHINITIS, UNSPECIFIED: ICD-10-CM

## 2025-04-04 DIAGNOSIS — R06.02 SHORTNESS OF BREATH: ICD-10-CM

## 2025-04-04 DIAGNOSIS — J39.8 OTHER SPECIFIED DISEASES OF UPPER RESPIRATORY TRACT: ICD-10-CM

## 2025-04-04 DIAGNOSIS — Z79.899 OTHER LONG TERM (CURRENT) DRUG THERAPY: ICD-10-CM

## 2025-04-04 DIAGNOSIS — K21.9 GASTRO-ESOPHAGEAL REFLUX DISEASE W/OUT ESOPHAGITIS: ICD-10-CM

## 2025-04-04 PROCEDURE — 99214 OFFICE O/P EST MOD 30 MIN: CPT | Mod: 25

## 2025-04-04 PROCEDURE — 94010 BREATHING CAPACITY TEST: CPT

## 2025-04-04 PROCEDURE — 94729 DIFFUSING CAPACITY: CPT

## 2025-04-04 PROCEDURE — 94727 GAS DIL/WSHOT DETER LNG VOL: CPT

## 2025-04-04 PROCEDURE — ZZZZZ: CPT

## 2025-04-04 PROCEDURE — 95012 NITRIC OXIDE EXP GAS DETER: CPT

## 2025-04-04 RX ORDER — SODIUM CHLORIDE SOLN NEBU 7% 7 %
7 NEBU SOLN INHALATION
Qty: 240 | Refills: 6 | Status: DISCONTINUED | COMMUNITY
Start: 2025-04-04 | End: 2025-04-04

## 2025-04-04 RX ORDER — SODIUM CHLORIDE FOR INHALATION 3 %
3 VIAL, NEBULIZER (ML) INHALATION
Qty: 1 | Refills: 1 | Status: ACTIVE | COMMUNITY
Start: 2025-04-04 | End: 1900-01-01

## 2025-04-09 ENCOUNTER — APPOINTMENT (OUTPATIENT)
Dept: ELECTROPHYSIOLOGY | Facility: CLINIC | Age: 80
End: 2025-04-09
Payer: MEDICARE

## 2025-04-09 VITALS
DIASTOLIC BLOOD PRESSURE: 58 MMHG | SYSTOLIC BLOOD PRESSURE: 116 MMHG | OXYGEN SATURATION: 98 % | BODY MASS INDEX: 23.57 KG/M2 | HEART RATE: 59 BPM | WEIGHT: 155 LBS

## 2025-04-09 DIAGNOSIS — I35.0 NONRHEUMATIC AORTIC (VALVE) STENOSIS: ICD-10-CM

## 2025-04-09 DIAGNOSIS — I48.91 UNSPECIFIED ATRIAL FIBRILLATION: ICD-10-CM

## 2025-04-09 PROCEDURE — 99213 OFFICE O/P EST LOW 20 MIN: CPT

## 2025-04-09 PROCEDURE — 93000 ELECTROCARDIOGRAM COMPLETE: CPT

## 2025-04-09 RX ORDER — SIMVASTATIN 40 MG/1
40 TABLET, FILM COATED ORAL DAILY
Qty: 90 | Refills: 3 | Status: ACTIVE | COMMUNITY

## 2025-06-04 ENCOUNTER — APPOINTMENT (OUTPATIENT)
Dept: CARDIOLOGY | Facility: CLINIC | Age: 80
End: 2025-06-04
Payer: MEDICARE

## 2025-06-04 VITALS
DIASTOLIC BLOOD PRESSURE: 64 MMHG | OXYGEN SATURATION: 98 % | HEIGHT: 68 IN | SYSTOLIC BLOOD PRESSURE: 108 MMHG | BODY MASS INDEX: 24.25 KG/M2 | WEIGHT: 160 LBS | HEART RATE: 73 BPM

## 2025-06-04 DIAGNOSIS — I25.10 ATHEROSCLEROTIC HEART DISEASE OF NATIVE CORONARY ARTERY W/OUT ANGINA PECTORIS: ICD-10-CM

## 2025-06-04 DIAGNOSIS — I35.1 NONRHEUMATIC AORTIC (VALVE) INSUFFICIENCY: ICD-10-CM

## 2025-06-04 DIAGNOSIS — J39.8 OTHER SPECIFIED DISEASES OF UPPER RESPIRATORY TRACT: ICD-10-CM

## 2025-06-04 DIAGNOSIS — Z95.820 PERIPHERAL VASCULAR ANGIOPLASTY STATUS WITH IMPLANTS AND GRAFTS: ICD-10-CM

## 2025-06-04 DIAGNOSIS — I48.0 PAROXYSMAL ATRIAL FIBRILLATION: ICD-10-CM

## 2025-06-04 DIAGNOSIS — I35.0 NONRHEUMATIC AORTIC (VALVE) STENOSIS: ICD-10-CM

## 2025-06-04 DIAGNOSIS — I48.91 UNSPECIFIED ATRIAL FIBRILLATION: ICD-10-CM

## 2025-06-04 DIAGNOSIS — Z86.79 OTHER SPECIFIED POSTPROCEDURAL STATES: ICD-10-CM

## 2025-06-04 DIAGNOSIS — Z77.090 CONTACT WITH AND (SUSPECTED) EXPOSURE TO ASBESTOS: ICD-10-CM

## 2025-06-04 DIAGNOSIS — E78.5 HYPERLIPIDEMIA, UNSPECIFIED: ICD-10-CM

## 2025-06-04 DIAGNOSIS — Z98.890 OTHER SPECIFIED POSTPROCEDURAL STATES: ICD-10-CM

## 2025-06-04 PROCEDURE — 99215 OFFICE O/P EST HI 40 MIN: CPT

## 2025-07-03 ENCOUNTER — RX RENEWAL (OUTPATIENT)
Age: 80
End: 2025-07-03

## 2025-07-07 NOTE — BRIEF OPERATIVE NOTE - TYPE OF ANESTHESIA
General

## 2025-07-21 ENCOUNTER — OFFICE (OUTPATIENT)
Dept: URBAN - METROPOLITAN AREA CLINIC 12 | Facility: CLINIC | Age: 80
Setting detail: OPHTHALMOLOGY
End: 2025-07-21
Payer: MEDICARE

## 2025-07-21 DIAGNOSIS — H01.001: ICD-10-CM
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
## 2025-07-21 DIAGNOSIS — H01.004: ICD-10-CM

## 2025-07-21 DIAGNOSIS — L71.0: ICD-10-CM

## 2025-07-21 PROCEDURE — 92012 INTRM OPH EXAM EST PATIENT: CPT | Performed by: OPHTHALMOLOGY

## 2025-07-21 PROCEDURE — BRUDER EYE BRUDER EYE PADS: Performed by: OPHTHALMOLOGY

## 2025-07-21 ASSESSMENT — REFRACTION_MANIFEST
OS_SPHERE: -0.50
OS_AXIS: 015
OD_SPHERE: +0.25
OS_SPHERE: -2.50
OD_CYLINDER: -0.50
OS_AXIS: 065
OD_VPRISM: BU
OS_AXIS: 010
OD_AXIS: 095
OS_ADD: +2.50
OS_CYLINDER: -0.25
OS_VA1: 20/20-2
OD_VA1: 20/20
OD_AXIS: 100
OS_SPHERE: -0.50
OS_CYLINDER: -0.25
OD_ADD: +2.50
OS_VA1: 20/20
OD_CYLINDER: -1.25
OD_SPHERE: PLANO
OD_AXIS: 110
OD_CYLINDER: -0.50
OD_SPHERE: -2.00
OD_VA2: 20/20(J1+)
OS_VA2: 20/20(J1+)
OS_VPRISM: BD
OS_CYLINDER: -0.25
OS_ADD: +2.50
OD_VA1: 20/30
OD_VA1: 20/NI
OD_ADD: +2.50
OU_VA: 20/20-2
OS_VA1: 20/25+

## 2025-07-21 ASSESSMENT — REFRACTION_CURRENTRX
OS_OVR_VA: 20/
OD_AXIS: 079
OS_ADD: +2.50
OS_OVR_VA: 20/
OD_CYLINDER: -0.50
OS_AXIS: 095
OD_ADD: +2.50
OD_OVR_VA: 20/
OD_CYLINDER: -1.00
OD_SPHERE: -0.50
OD_VPRISM_DIRECTION: PROGS
OD_CYLINDER: -1.00
OS_VPRISM_DIRECTION: PROGS
OD_VPRISM_DIRECTION: PROGS
OD_OVR_VA: 20/
OS_SPHERE: -1.00
OD_VPRISM_DIRECTION: PROGS
OS_SPHERE: -0.50
OD_SPHERE: -3.00
OS_ADD: +2.25
OS_ADD: +2.50
OS_CYLINDER: -0.50
OD_ADD: +2.25
OS_CYLINDER: -0.50
OS_OVR_VA: 20/
OD_ADD: +2.50
OD_AXIS: 105
OS_CYLINDER: -0.25
OS_VPRISM_DIRECTION: PROGS
OD_OVR_VA: 20/
OD_SPHERE: -0.50
OD_AXIS: 093
OS_AXIS: 005
OS_VPRISM_DIRECTION: PROGS
OS_SPHERE: -3.25
OS_AXIS: 039

## 2025-07-21 ASSESSMENT — KERATOMETRY
OD_K1POWER_DIOPTERS: 45.25
OS_AXISANGLE_DEGREES: 158
METHOD_AUTO_MANUAL: AUTO
OD_K2POWER_DIOPTERS: 46.25
OS_K2POWER_DIOPTERS: 45.75
OD_AXISANGLE_DEGREES: 013
OS_K1POWER_DIOPTERS: 45.50

## 2025-07-21 ASSESSMENT — LID POSITION - DERMATOCHALASIS
OD_DERMATOCHALASIS: 2+
OS_DERMATOCHALASIS: 2+

## 2025-07-21 ASSESSMENT — CONFRONTATIONAL VISUAL FIELD TEST (CVF)
OS_FINDINGS: FULL
OD_FINDINGS: FULL

## 2025-07-21 ASSESSMENT — REFRACTION_AUTOREFRACTION
OS_SPHERE: -0.75
OD_SPHERE: -0.25
OD_CYLINDER: -1.50
OS_AXIS: 056
OD_AXIS: 099
OS_CYLINDER: -0.50

## 2025-07-21 ASSESSMENT — VISUAL ACUITY
OD_BCVA: 20/40
OS_BCVA: 20/25+2

## 2025-07-21 ASSESSMENT — LID EXAM ASSESSMENTS
OS_BLEPHARITIS: LUL 1+
OD_EDEMA: ABSENT
OD_BLEPHARITIS: RUL 1+

## 2025-07-22 ENCOUNTER — APPOINTMENT (OUTPATIENT)
Dept: ELECTROPHYSIOLOGY | Facility: CLINIC | Age: 80
End: 2025-07-22

## 2025-07-23 ENCOUNTER — APPOINTMENT (OUTPATIENT)
Dept: CARDIOLOGY | Facility: CLINIC | Age: 80
End: 2025-07-23

## 2025-07-25 ENCOUNTER — APPOINTMENT (OUTPATIENT)
Dept: ELECTROPHYSIOLOGY | Facility: CLINIC | Age: 80
End: 2025-07-25
Payer: MEDICARE

## 2025-07-25 VITALS
DIASTOLIC BLOOD PRESSURE: 62 MMHG | SYSTOLIC BLOOD PRESSURE: 126 MMHG | HEIGHT: 68 IN | WEIGHT: 153 LBS | HEART RATE: 58 BPM | BODY MASS INDEX: 23.19 KG/M2 | OXYGEN SATURATION: 96 %

## 2025-07-25 DIAGNOSIS — I35.0 NONRHEUMATIC AORTIC (VALVE) STENOSIS: ICD-10-CM

## 2025-07-25 DIAGNOSIS — I48.91 UNSPECIFIED ATRIAL FIBRILLATION: ICD-10-CM

## 2025-07-25 PROCEDURE — 93000 ELECTROCARDIOGRAM COMPLETE: CPT

## 2025-07-25 PROCEDURE — 99214 OFFICE O/P EST MOD 30 MIN: CPT

## 2025-08-27 ENCOUNTER — OUTPATIENT (OUTPATIENT)
Dept: OUTPATIENT SERVICES | Facility: HOSPITAL | Age: 80
LOS: 1 days | End: 2025-08-27
Payer: MEDICARE

## 2025-08-27 DIAGNOSIS — J34.2 DEVIATED NASAL SEPTUM: Chronic | ICD-10-CM

## 2025-08-27 DIAGNOSIS — Z98.890 OTHER SPECIFIED POSTPROCEDURAL STATES: Chronic | ICD-10-CM

## 2025-08-27 DIAGNOSIS — Z90.49 ACQUIRED ABSENCE OF OTHER SPECIFIED PARTS OF DIGESTIVE TRACT: Chronic | ICD-10-CM

## 2025-08-27 DIAGNOSIS — Z86.79 PERSONAL HISTORY OF OTHER DISEASES OF THE CIRCULATORY SYSTEM: Chronic | ICD-10-CM

## 2025-08-27 DIAGNOSIS — I35.0 NONRHEUMATIC AORTIC (VALVE) STENOSIS: ICD-10-CM

## 2025-08-28 ENCOUNTER — APPOINTMENT (OUTPATIENT)
Dept: CARDIOTHORACIC SURGERY | Facility: CLINIC | Age: 80
End: 2025-08-28
Payer: MEDICARE

## 2025-08-28 ENCOUNTER — RESULT REVIEW (OUTPATIENT)
Age: 80
End: 2025-08-28

## 2025-08-28 ENCOUNTER — NON-APPOINTMENT (OUTPATIENT)
Age: 80
End: 2025-08-28

## 2025-08-28 ENCOUNTER — APPOINTMENT (OUTPATIENT)
Dept: CARDIOTHORACIC SURGERY | Facility: CLINIC | Age: 80
End: 2025-08-28

## 2025-08-28 VITALS
BODY MASS INDEX: 23.19 KG/M2 | DIASTOLIC BLOOD PRESSURE: 74 MMHG | HEART RATE: 55 BPM | HEIGHT: 68 IN | RESPIRATION RATE: 16 BRPM | WEIGHT: 153 LBS | OXYGEN SATURATION: 100 % | SYSTOLIC BLOOD PRESSURE: 122 MMHG

## 2025-08-28 PROCEDURE — 93356 MYOCRD STRAIN IMG SPCKL TRCK: CPT

## 2025-08-28 PROCEDURE — 99213 OFFICE O/P EST LOW 20 MIN: CPT

## 2025-08-28 PROCEDURE — 76376 3D RENDER W/INTRP POSTPROCES: CPT

## 2025-08-28 PROCEDURE — 93306 TTE W/DOPPLER COMPLETE: CPT

## 2025-08-28 PROCEDURE — 76376 3D RENDER W/INTRP POSTPROCES: CPT | Mod: 26

## 2025-08-28 PROCEDURE — 93306 TTE W/DOPPLER COMPLETE: CPT | Mod: 26

## 2025-08-28 RX ORDER — SIMVASTATIN 40 MG/1
40 TABLET, FILM COATED ORAL
Refills: 0 | Status: ACTIVE | COMMUNITY
Start: 2025-08-28